# Patient Record
Sex: MALE | Race: WHITE | NOT HISPANIC OR LATINO | URBAN - METROPOLITAN AREA
[De-identification: names, ages, dates, MRNs, and addresses within clinical notes are randomized per-mention and may not be internally consistent; named-entity substitution may affect disease eponyms.]

---

## 2017-01-04 ENCOUNTER — ALLSCRIPTS OFFICE VISIT (OUTPATIENT)
Dept: OTHER | Facility: OTHER | Age: 42
End: 2017-01-04

## 2017-01-04 DIAGNOSIS — Z47.89 ENCOUNTER FOR OTHER ORTHOPEDIC AFTERCARE: ICD-10-CM

## 2017-01-18 ENCOUNTER — APPOINTMENT (OUTPATIENT)
Dept: OCCUPATIONAL THERAPY | Facility: CLINIC | Age: 42
End: 2017-01-18
Payer: COMMERCIAL

## 2017-01-18 DIAGNOSIS — Z47.89 ENCOUNTER FOR OTHER ORTHOPEDIC AFTERCARE: ICD-10-CM

## 2017-01-18 PROCEDURE — 97165 OT EVAL LOW COMPLEX 30 MIN: CPT

## 2017-01-18 PROCEDURE — 97110 THERAPEUTIC EXERCISES: CPT

## 2017-01-23 ENCOUNTER — APPOINTMENT (OUTPATIENT)
Dept: OCCUPATIONAL THERAPY | Facility: CLINIC | Age: 42
End: 2017-01-23
Payer: COMMERCIAL

## 2017-01-23 PROCEDURE — 97018 PARAFFIN BATH THERAPY: CPT

## 2017-01-23 PROCEDURE — 97110 THERAPEUTIC EXERCISES: CPT

## 2017-01-23 PROCEDURE — 97010 HOT OR COLD PACKS THERAPY: CPT

## 2017-01-26 ENCOUNTER — APPOINTMENT (OUTPATIENT)
Dept: OCCUPATIONAL THERAPY | Facility: CLINIC | Age: 42
End: 2017-01-26
Payer: COMMERCIAL

## 2017-01-26 PROCEDURE — 97140 MANUAL THERAPY 1/> REGIONS: CPT

## 2017-01-26 PROCEDURE — 97110 THERAPEUTIC EXERCISES: CPT

## 2017-01-26 PROCEDURE — 97018 PARAFFIN BATH THERAPY: CPT

## 2017-01-30 ENCOUNTER — APPOINTMENT (OUTPATIENT)
Dept: OCCUPATIONAL THERAPY | Facility: CLINIC | Age: 42
End: 2017-01-30
Payer: COMMERCIAL

## 2017-01-30 PROCEDURE — 97140 MANUAL THERAPY 1/> REGIONS: CPT

## 2017-01-30 PROCEDURE — 97018 PARAFFIN BATH THERAPY: CPT

## 2017-01-30 PROCEDURE — 97110 THERAPEUTIC EXERCISES: CPT

## 2017-02-01 ENCOUNTER — ALLSCRIPTS OFFICE VISIT (OUTPATIENT)
Dept: OTHER | Facility: OTHER | Age: 42
End: 2017-02-01

## 2017-02-02 ENCOUNTER — APPOINTMENT (OUTPATIENT)
Dept: OCCUPATIONAL THERAPY | Facility: CLINIC | Age: 42
End: 2017-02-02
Payer: COMMERCIAL

## 2017-02-06 ENCOUNTER — APPOINTMENT (OUTPATIENT)
Dept: OCCUPATIONAL THERAPY | Facility: CLINIC | Age: 42
End: 2017-02-06
Payer: COMMERCIAL

## 2017-02-06 PROCEDURE — 97140 MANUAL THERAPY 1/> REGIONS: CPT

## 2017-02-06 PROCEDURE — 97110 THERAPEUTIC EXERCISES: CPT

## 2017-02-06 PROCEDURE — 97018 PARAFFIN BATH THERAPY: CPT

## 2017-02-07 ENCOUNTER — APPOINTMENT (OUTPATIENT)
Dept: OCCUPATIONAL THERAPY | Facility: CLINIC | Age: 42
End: 2017-02-07
Payer: COMMERCIAL

## 2017-02-09 ENCOUNTER — APPOINTMENT (OUTPATIENT)
Dept: OCCUPATIONAL THERAPY | Facility: CLINIC | Age: 42
End: 2017-02-09
Payer: COMMERCIAL

## 2017-02-09 PROCEDURE — 97140 MANUAL THERAPY 1/> REGIONS: CPT

## 2017-02-09 PROCEDURE — 97110 THERAPEUTIC EXERCISES: CPT

## 2017-02-09 PROCEDURE — 97018 PARAFFIN BATH THERAPY: CPT

## 2017-02-13 ENCOUNTER — APPOINTMENT (OUTPATIENT)
Dept: OCCUPATIONAL THERAPY | Facility: CLINIC | Age: 42
End: 2017-02-13
Payer: COMMERCIAL

## 2017-02-16 ENCOUNTER — APPOINTMENT (OUTPATIENT)
Dept: OCCUPATIONAL THERAPY | Facility: CLINIC | Age: 42
End: 2017-02-16
Payer: COMMERCIAL

## 2017-02-20 ENCOUNTER — APPOINTMENT (OUTPATIENT)
Dept: OCCUPATIONAL THERAPY | Facility: CLINIC | Age: 42
End: 2017-02-20
Payer: COMMERCIAL

## 2017-02-20 PROCEDURE — 97140 MANUAL THERAPY 1/> REGIONS: CPT

## 2017-02-20 PROCEDURE — 97110 THERAPEUTIC EXERCISES: CPT

## 2017-02-20 PROCEDURE — 97018 PARAFFIN BATH THERAPY: CPT

## 2017-02-23 ENCOUNTER — APPOINTMENT (OUTPATIENT)
Dept: OCCUPATIONAL THERAPY | Facility: CLINIC | Age: 42
End: 2017-02-23
Payer: COMMERCIAL

## 2017-02-23 PROCEDURE — 97018 PARAFFIN BATH THERAPY: CPT

## 2017-02-23 PROCEDURE — 97140 MANUAL THERAPY 1/> REGIONS: CPT

## 2017-02-23 PROCEDURE — 97110 THERAPEUTIC EXERCISES: CPT

## 2017-02-27 ENCOUNTER — GENERIC CONVERSION - ENCOUNTER (OUTPATIENT)
Dept: OTHER | Facility: OTHER | Age: 42
End: 2017-02-27

## 2017-02-27 ENCOUNTER — APPOINTMENT (OUTPATIENT)
Dept: OCCUPATIONAL THERAPY | Facility: CLINIC | Age: 42
End: 2017-02-27
Payer: COMMERCIAL

## 2017-02-27 PROCEDURE — 97140 MANUAL THERAPY 1/> REGIONS: CPT

## 2017-02-27 PROCEDURE — 97110 THERAPEUTIC EXERCISES: CPT

## 2017-02-27 PROCEDURE — 97022 WHIRLPOOL THERAPY: CPT

## 2017-03-02 ENCOUNTER — APPOINTMENT (OUTPATIENT)
Dept: OCCUPATIONAL THERAPY | Facility: CLINIC | Age: 42
End: 2017-03-02
Payer: COMMERCIAL

## 2017-03-02 PROCEDURE — 97110 THERAPEUTIC EXERCISES: CPT

## 2017-03-02 PROCEDURE — 97140 MANUAL THERAPY 1/> REGIONS: CPT

## 2017-03-02 PROCEDURE — 97022 WHIRLPOOL THERAPY: CPT

## 2017-03-03 ENCOUNTER — GENERIC CONVERSION - ENCOUNTER (OUTPATIENT)
Dept: OTHER | Facility: OTHER | Age: 42
End: 2017-03-03

## 2017-03-06 ENCOUNTER — APPOINTMENT (OUTPATIENT)
Dept: OCCUPATIONAL THERAPY | Facility: CLINIC | Age: 42
End: 2017-03-06
Payer: COMMERCIAL

## 2017-03-06 PROCEDURE — 97022 WHIRLPOOL THERAPY: CPT

## 2017-03-06 PROCEDURE — 97140 MANUAL THERAPY 1/> REGIONS: CPT

## 2017-03-06 PROCEDURE — 97110 THERAPEUTIC EXERCISES: CPT

## 2017-03-09 ENCOUNTER — APPOINTMENT (OUTPATIENT)
Dept: OCCUPATIONAL THERAPY | Facility: CLINIC | Age: 42
End: 2017-03-09
Payer: COMMERCIAL

## 2017-03-09 PROCEDURE — 97022 WHIRLPOOL THERAPY: CPT

## 2017-03-09 PROCEDURE — 97110 THERAPEUTIC EXERCISES: CPT

## 2017-03-09 PROCEDURE — 97140 MANUAL THERAPY 1/> REGIONS: CPT

## 2017-03-13 ENCOUNTER — APPOINTMENT (OUTPATIENT)
Dept: OCCUPATIONAL THERAPY | Facility: CLINIC | Age: 42
End: 2017-03-13
Payer: COMMERCIAL

## 2017-03-16 ENCOUNTER — APPOINTMENT (OUTPATIENT)
Dept: OCCUPATIONAL THERAPY | Facility: CLINIC | Age: 42
End: 2017-03-16
Payer: COMMERCIAL

## 2017-03-16 PROCEDURE — 97110 THERAPEUTIC EXERCISES: CPT

## 2017-03-16 PROCEDURE — 97022 WHIRLPOOL THERAPY: CPT

## 2017-03-16 PROCEDURE — 97140 MANUAL THERAPY 1/> REGIONS: CPT

## 2017-03-20 ENCOUNTER — APPOINTMENT (OUTPATIENT)
Dept: OCCUPATIONAL THERAPY | Facility: CLINIC | Age: 42
End: 2017-03-20
Payer: COMMERCIAL

## 2017-03-23 ENCOUNTER — APPOINTMENT (OUTPATIENT)
Dept: OCCUPATIONAL THERAPY | Facility: CLINIC | Age: 42
End: 2017-03-23
Payer: COMMERCIAL

## 2017-03-23 PROCEDURE — 97140 MANUAL THERAPY 1/> REGIONS: CPT

## 2017-03-23 PROCEDURE — 97110 THERAPEUTIC EXERCISES: CPT

## 2017-03-23 PROCEDURE — 97010 HOT OR COLD PACKS THERAPY: CPT

## 2017-03-27 ENCOUNTER — APPOINTMENT (OUTPATIENT)
Dept: OCCUPATIONAL THERAPY | Facility: CLINIC | Age: 42
End: 2017-03-27
Payer: COMMERCIAL

## 2017-03-30 ENCOUNTER — APPOINTMENT (OUTPATIENT)
Dept: OCCUPATIONAL THERAPY | Facility: CLINIC | Age: 42
End: 2017-03-30
Payer: COMMERCIAL

## 2017-08-02 ENCOUNTER — TRANSCRIBE ORDERS (OUTPATIENT)
Dept: ADMINISTRATIVE | Facility: HOSPITAL | Age: 42
End: 2017-08-02

## 2017-08-02 DIAGNOSIS — N63.0 LUMP OR MASS IN BREAST: Primary | ICD-10-CM

## 2017-08-04 ENCOUNTER — HOSPITAL ENCOUNTER (OUTPATIENT)
Dept: MAMMOGRAPHY | Facility: CLINIC | Age: 42
Discharge: HOME | End: 2017-08-04
Payer: COMMERCIAL

## 2017-08-04 ENCOUNTER — HOSPITAL ENCOUNTER (OUTPATIENT)
Dept: ULTRASOUND IMAGING | Facility: CLINIC | Age: 42
Discharge: HOME | End: 2017-08-04
Payer: COMMERCIAL

## 2017-08-04 DIAGNOSIS — N63.0 LUMP OR MASS IN BREAST: ICD-10-CM

## 2017-08-04 PROCEDURE — 76642 ULTRASOUND BREAST LIMITED: CPT

## 2017-08-04 PROCEDURE — G0204 DX MAMMO INCL CAD BI: HCPCS

## 2018-01-12 VITALS
DIASTOLIC BLOOD PRESSURE: 78 MMHG | HEART RATE: 72 BPM | HEIGHT: 70 IN | WEIGHT: 164 LBS | BODY MASS INDEX: 23.48 KG/M2 | SYSTOLIC BLOOD PRESSURE: 122 MMHG

## 2018-01-13 VITALS
HEART RATE: 74 BPM | BODY MASS INDEX: 25.34 KG/M2 | SYSTOLIC BLOOD PRESSURE: 116 MMHG | HEIGHT: 70 IN | DIASTOLIC BLOOD PRESSURE: 80 MMHG | WEIGHT: 177 LBS

## 2018-01-13 NOTE — MISCELLANEOUS
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

## 2018-01-13 NOTE — MISCELLANEOUS
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

## 2018-08-10 ENCOUNTER — APPOINTMENT (OUTPATIENT)
Dept: RADIOLOGY | Facility: CLINIC | Age: 43
End: 2018-08-10
Payer: COMMERCIAL

## 2018-08-10 ENCOUNTER — OFFICE VISIT (OUTPATIENT)
Dept: URGENT CARE | Facility: CLINIC | Age: 43
End: 2018-08-10
Payer: COMMERCIAL

## 2018-08-10 VITALS
BODY MASS INDEX: 20.3 KG/M2 | TEMPERATURE: 98.1 F | RESPIRATION RATE: 16 BRPM | WEIGHT: 141.8 LBS | HEART RATE: 68 BPM | HEIGHT: 70 IN | DIASTOLIC BLOOD PRESSURE: 78 MMHG | SYSTOLIC BLOOD PRESSURE: 136 MMHG | OXYGEN SATURATION: 96 %

## 2018-08-10 DIAGNOSIS — S89.91XA INJURY OF RIGHT SHIN, INITIAL ENCOUNTER: ICD-10-CM

## 2018-08-10 DIAGNOSIS — S89.91XA INJURY OF RIGHT SHIN, INITIAL ENCOUNTER: Primary | ICD-10-CM

## 2018-08-10 PROCEDURE — 73590 X-RAY EXAM OF LOWER LEG: CPT

## 2018-08-10 PROCEDURE — 99213 OFFICE O/P EST LOW 20 MIN: CPT | Performed by: PHYSICIAN ASSISTANT

## 2018-08-10 RX ORDER — METHYLPHENIDATE HYDROCHLORIDE 27 MG/1
27 TABLET ORAL DAILY
COMMUNITY

## 2018-08-10 RX ORDER — ARIPIPRAZOLE 5 MG/1
7 TABLET ORAL DAILY
COMMUNITY

## 2018-08-10 RX ORDER — GUANFACINE 2 MG/1
3 TABLET ORAL
COMMUNITY

## 2018-08-10 NOTE — PROGRESS NOTES
NAME: Hector Cervantes is a 38 y o  male  : 1975    MRN: [id number]      Assessment and Plan   Injury of right shin, initial encounter [S89 [id number]]  1  Injury of right shin, initial encounter  XR tibia fibula 2 vw right     x-ray right LE: without acute fracture      Patient Instructions   Patient Instructions   Take ibuprofen for pain  Apply ice to the area  Apply antibiotic ointment daily  If develops fever, chills, spreading redness, discharge from the wound follow up either here with PCP  Call tomorrow for official radiology read    Proceed to ER if symptoms worsen  History of Present Illness     Patient presents complaining of right lower leg pain  He reports a week ago he was dragged by his dogs across wet grass and hit his right anterior shin on a deck post   He reports that the next day or so he started to feel little better but since then he has not improved at all  He reports pain with dorsiflexion  Denies any numbness or tingling to the foot or any weakness  Reports that he recently had a tetanus within the last year to after being bit by a dog  Denies any fever, chills, spreading redness, warmth, discharge  Review of Systems   Review of Systems   Constitutional: Negative for chills and fever  Skin: Positive for wound           Current Medications       Current Outpatient Prescriptions:     ARIPiprazole (ABILIFY) 5 mg tablet, Take 7 mg by mouth daily, Disp: , Rfl:     guanFACINE (TENEX) 2 MG tablet, Take 3 mg by mouth daily at bedtime, Disp: , Rfl:     methylphenidate (CONCERTA) 27 MG ER tablet, Take 27 mg by mouth daily, Disp: , Rfl:     Current Allergies     Allergies as of 08/10/2018 - Reviewed 08/10/2018   Allergen Reaction Noted    Penicillins  2016              Past Medical History:   Diagnosis Date    ADHD (attention deficit hyperactivity disorder)     Asperger's syndrome     Myoclonic jerking        Past Surgical History:   Procedure Laterality Date    BREAST LUMPECTOMY Left     HAND TENDON SURGERY Right 12/14/2016    Procedure: REPAIR EXTENSOR TENDON RIGHT THUMB ;  Surgeon: Amber Acuna MD;  Location: [facility] OR;  Service:        No family history on file  Medications have been verified  The following portions of the patient's history were reviewed and updated as appropriate: allergies, current medications, past family history, past medical history, past social history, past surgical history and problem list     Objective   /78   Pulse 68   Temp 98 1 °F (36 7 °C) (Tympanic)   Resp 16   Ht 5' 10" (1 778 m)   Wt 64 3 kg (141 lb 12 8 oz)   SpO2 96%   BMI 20 35 kg/m²      Physical Exam     Physical Exam   Constitutional: He appears well-developed and well-nourished  No distress  Skin:   7 cm by 2 cm longitudinal linear wound completely scabbed over with mild surrounding erythema and immediate borders  Without further erythema, warmth, edema, ecchymosis or discharge from the wound  Tender to palpation over the tibia adjacent to the wound  Full range of motion of knee and ankle with some mild pain on dorsiflexion   NSI

## 2018-08-10 NOTE — PATIENT INSTRUCTIONS
Take ibuprofen for pain  Apply ice to the area  Apply antibiotic ointment daily  If develops fever, chills, spreading redness, discharge from the wound follow up either here with PCP  Call tomorrow for official radiology read

## 2018-10-24 ENCOUNTER — TRANSCRIBE ORDERS (OUTPATIENT)
Dept: ADMINISTRATIVE | Facility: HOSPITAL | Age: 43
End: 2018-10-24

## 2018-10-24 ENCOUNTER — HOSPITAL ENCOUNTER (OUTPATIENT)
Dept: RADIOLOGY | Facility: HOSPITAL | Age: 43
Discharge: HOME | End: 2018-10-24
Attending: FAMILY MEDICINE
Payer: COMMERCIAL

## 2018-10-24 DIAGNOSIS — M25.512 PAIN IN JOINT OF LEFT SHOULDER: ICD-10-CM

## 2018-10-24 DIAGNOSIS — M25.512 PAIN IN JOINT OF LEFT SHOULDER: Primary | ICD-10-CM

## 2018-10-24 PROCEDURE — 73030 X-RAY EXAM OF SHOULDER: CPT

## 2018-11-30 ENCOUNTER — OFFICE VISIT (OUTPATIENT)
Dept: OBGYN CLINIC | Facility: CLINIC | Age: 43
End: 2018-11-30
Payer: COMMERCIAL

## 2018-11-30 VITALS
SYSTOLIC BLOOD PRESSURE: 114 MMHG | HEIGHT: 70 IN | WEIGHT: 156 LBS | BODY MASS INDEX: 22.33 KG/M2 | DIASTOLIC BLOOD PRESSURE: 76 MMHG | HEART RATE: 69 BPM

## 2018-11-30 DIAGNOSIS — M75.02 ADHESIVE CAPSULITIS OF LEFT SHOULDER: Primary | ICD-10-CM

## 2018-11-30 PROCEDURE — 99243 OFF/OP CNSLTJ NEW/EST LOW 30: CPT | Performed by: ORTHOPAEDIC SURGERY

## 2018-11-30 NOTE — PROGRESS NOTES
Ortho Sports Medicine Shoulder Visit     Assesment:     left shoulder adhesive capsulitis    Plan:    Conservative treatment:    Ice to shoulder 1-2 times daily, for 20 minutes at a time  PT for ROM and strengthening to shoulder, rotator cuff, scapular stabilizers  Imaging: All imaging from today was reviewed by myself and explained to the patient  Injection:    No Injection planned at this time  Will consider an ultrasound guided glenohumeral joint corticosteroid injection at our next visit if his symptoms worsen or fail to improve  Surgery:     No surgery is recommended at this point, continue with conservative treatment plan as noted  Follow up:    Return in about 8 weeks (around 1/25/2019) for Recheck  Chief Complaint   Patient presents with    Left Shoulder - Pain     History of Present Illness: The patient is a 37 y o , right hand dominant male whose occupation is , referred to me by their primary care physician, seen in clinic for consultation of left shoulder pain  The patient denies a history of diabetes  The patient denies a history of thyroid disorder  Pain is located anterior, lateral, deep  The patient rates the pain as a 6/10  The pain has been present for 4 months  The patient denies an injury  The pain was insidious in nature  The pain is characterized as dull, achy  The pain is present daily  Pain is improved by rest and ice  Pain is aggravated by overhead activity, reaching back, rotation, lifting  and exercising  Symptoms include clicking, catching and popping  The patient denies weakness  The patient denies numbness and tingling  The patient has tried rest, ice and NSAIDS            Shoulder Surgical History:  None    Past Medical, Social and Family History:  Past Medical History:   Diagnosis Date    ADHD (attention deficit hyperactivity disorder)     Asperger's syndrome     Myoclonic jerking      Past Surgical History:   Procedure Laterality Date    BREAST LUMPECTOMY Left     HAND TENDON SURGERY Right 12/14/2016    Procedure: REPAIR EXTENSOR TENDON RIGHT THUMB ;  Surgeon: Ruth Holt MD;  [facility] OR;  Service:      Allergies   Allergen Reactions    Penicillins      Current Outpatient Prescriptions on File Prior to Visit   Medication Sig Dispense Refill    ARIPiprazole (ABILIFY) 5 mg tablet Take 7 mg by mouth daily      guanFACINE (TENEX) 2 MG tablet Take 3 mg by mouth daily at bedtime      methylphenidate (CONCERTA) 27 MG ER tablet Take 27 mg by mouth daily       No current facility-administered medications on file prior to visit  Social History     Social History    Marital status: Single     Spouse name: N/A    Number of children: N/A    Years of education: N/A     Occupational History    Not on file  Social History Main Topics    Smoking status: Never Smoker    Smokeless tobacco: Never Used    Alcohol use Yes    Drug use: No    Sexual activity: Not on file     Other Topics Concern    Not on file     Social History Narrative    No narrative on file         I have reviewed the past medical, surgical, social and family history, medications and allergies as documented in the EMR  Review of systems: ROS is negative other than that noted in the HPI  Constitutional: Negative for fatigue and fever  HENT: Negative for sore throat  Respiratory: Negative for shortness of breath  Cardiovascular: Negative for chest pain  Gastrointestinal: Negative for abdominal pain  Endocrine: Negative for cold intolerance and heat intolerance  Genitourinary: Negative for flank pain  Musculoskeletal: Negative for back pain  Skin: Negative for rash  Allergic/Immunologic: Negative for immunocompromised state  Neurological: Negative for dizziness  Psychiatric/Behavioral: Negative for agitation        Physical Exam:    Blood pressure 114/76, pulse 69, height 5' 10" (1 778 m), weight 70 8 kg (156 lb)  General/Constitutional: NAD, well developed, well nourished  HENT: Normocephalic, atraumatic  CV: Intact distal pulses, regular rate  Resp: No respiratory distress or labored breathing  Lymphatic: No lymphadenopathy palpated  Neuro: Alert and Oriented x 3, no focal deficits  Psych: Normal mood, normal affect, normal judgement, normal behavior  Skin: Warm, dry, no rashes, no erythema    Shoulder Exam (focused): Shoulder focused exam:       RIGHT LEFT    Scapula Atrophy Negative Negative     Winging Negative Negative     Protraction Negative Negative    Rotator cuff SS 5/5 5/5     IS 5/5 5/5     SubS 5/5 5/5    AROM  150     ER0 60 30     ER90 90 60     IR90 40 20     IRb T6 L4    TTP: AC Negative Negative     Biceps Negative Negative     Coracoid Negative Negative    Special Tests: O'Briens Negative Negative     Nguyen-shear Negative Negative     Cross body Adduction Negative Negative     Speeds  Negative Negative     Zaida's Negative Negative     Whipple Negative Negative       Neer Negative Negative     Martines Negative Negative    Instability: Apprehension & relocation not tested not tested     Load & shift not tested not tested    Other: Crank Negative Negative               PROM: Left     ER at 0: 30 degree     ER at 90: 30 degrees     IR at 90: 0 degrees    UE NV Exam: +2 Radial pulses bilaterally  Sensation intact to light touch C5-T1 bilaterally, Radial/median/ulnar nerve motor intact      Bilateral elbow, wrist, and and forearm ROM full, painless with passive ROM, no ttp or crepitance throughout extremities below shoulder joint    Cervical ROM is full without pain, numbness or tingling      Shoulder Imaging    X-rays of the left shoulder were reviewed, which demonstrates mild AC joint arthritis  Otherwise normal examination without fracture, dislocation, or osseous abnormality  I have reviewed the radiology report and agree with their impression        Scribe Attestation I,:   Cheko Medina am acting as a scribe while in the presence of the attending physician :        I,:   Caroline Blum, DO personally performed the services described in this documentation    as scribed in my presence :

## 2018-11-30 NOTE — LETTER
November 30, 2018     Patient: Ladonna Nino   YOB: 1975   Date of Visit: 11/30/2018       To Whom it May Concern:    Orlin Ca is under my professional care  He was seen in my office on 11/30/2018 for left shoulder adhesive capsulitis  He should be excused from all work activities that involve use of his left upper extremity until further notice  If you have any questions or concerns, please don't hesitate to call           Sincerely,          Doc Pollack DO        CC: No Recipients

## 2018-11-30 NOTE — LETTER
November 30, 2018     Tobi Haynes, [address] [address]    Patient: Topher Rausch   YOB: 1975   Date of Visit: 11/30/2018       Dear Dr River Ramírez: Thank you for referring Inocente Nix to me for evaluation  Below are my notes for this consultation  If you have questions, please do not hesitate to call me  I look forward to following your patient along with you  Sincerely,        Gabrielle Mcleod DO        CC: No Recipients  Gabrielle Mcleod DO  11/30/2018  1:05 PM  Sign at close encounter  Ortho Sports Medicine Shoulder Visit     Assesment:     left shoulder adhesive capsulitis    Plan:    Conservative treatment:    Ice to shoulder 1-2 times daily, for 20 minutes at a time  PT for ROM and strengthening to shoulder, rotator cuff, scapular stabilizers  Imaging: All imaging from today was reviewed by myself and explained to the patient  Injection:    No Injection planned at this time  Will consider an ultrasound guided glenohumeral joint corticosteroid injection at our next visit if his symptoms worsen or fail to improve  Surgery:     No surgery is recommended at this point, continue with conservative treatment plan as noted  Follow up:    Return in about 8 weeks (around 1/25/2019) for Recheck  Chief Complaint   Patient presents with    Left Shoulder - Pain     History of Present Illness: The patient is a 37 y o , right hand dominant male whose occupation is , referred to me by their primary care physician, seen in clinic for consultation of left shoulder pain  The patient denies a history of diabetes  The patient denies a history of thyroid disorder  Pain is located anterior, lateral, deep  The patient rates the pain as a 6/10  The pain has been present for 4 months  The patient denies an injury  The pain was insidious in nature  The pain is characterized as dull, achy    The pain is present daily  Pain is improved by rest and ice  Pain is aggravated by overhead activity, reaching back, rotation, lifting  and exercising  Symptoms include clicking, catching and popping  The patient denies weakness  The patient denies numbness and tingling  The patient has tried rest, ice and NSAIDS  Shoulder Surgical History:  None    Past Medical, Social and Family History:  Past Medical History:   Diagnosis Date    ADHD (attention deficit hyperactivity disorder)     Asperger's syndrome     Myoclonic jerking      Past Surgical History:   Procedure Laterality Date    BREAST LUMPECTOMY Left     HAND TENDON SURGERY Right 12/14/2016    Procedure: REPAIR EXTENSOR TENDON RIGHT THUMB ;  Surgeon: Roger Eldridge MD;  Location: [facility] OR;  Service:      Allergies   Allergen Reactions    Penicillins      Current Outpatient Prescriptions on File Prior to Visit   Medication Sig Dispense Refill    ARIPiprazole (ABILIFY) 5 mg tablet Take 7 mg by mouth daily      guanFACINE (TENEX) 2 MG tablet Take 3 mg by mouth daily at bedtime      methylphenidate (CONCERTA) 27 MG ER tablet Take 27 mg by mouth daily       No current facility-administered medications on file prior to visit  Social History     Social History    Marital status: Single     Spouse name: N/A    Number of children: N/A    Years of education: N/A     Occupational History    Not on file  Social History Main Topics    Smoking status: Never Smoker    Smokeless tobacco: Never Used    Alcohol use Yes    Drug use: No    Sexual activity: Not on file     Other Topics Concern    Not on file     Social History Narrative    No narrative on file         I have reviewed the past medical, surgical, social and family history, medications and allergies as documented in the EMR  Review of systems: ROS is negative other than that noted in the HPI  Constitutional: Negative for fatigue and fever     HENT: Negative for sore throat  Respiratory: Negative for shortness of breath  Cardiovascular: Negative for chest pain  Gastrointestinal: Negative for abdominal pain  Endocrine: Negative for cold intolerance and heat intolerance  Genitourinary: Negative for flank pain  Musculoskeletal: Negative for back pain  Skin: Negative for rash  Allergic/Immunologic: Negative for immunocompromised state  Neurological: Negative for dizziness  Psychiatric/Behavioral: Negative for agitation  Physical Exam:    Blood pressure 114/76, pulse 69, height 5' 10" (1 778 m), weight 70 8 kg (156 lb)  General/Constitutional: NAD, well developed, well nourished  HENT: Normocephalic, atraumatic  CV: Intact distal pulses, regular rate  Resp: No respiratory distress or labored breathing  Lymphatic: No lymphadenopathy palpated  Neuro: Alert and Oriented x 3, no focal deficits  Psych: Normal mood, normal affect, normal judgement, normal behavior  Skin: Warm, dry, no rashes, no erythema    Shoulder Exam (focused):     Shoulder focused exam:       RIGHT LEFT    Scapula Atrophy Negative Negative     Winging Negative Negative     Protraction Negative Negative    Rotator cuff SS 5/5 5/5     IS 5/5 5/5     SubS 5/5 5/5    AROM  150     ER0 60 30     ER90 90 60     IR90 40 20     IRb T6 L4    TTP: AC Negative Negative     Biceps Negative Negative     Coracoid Negative Negative    Special Tests: O'Briens Negative Negative     Nguyen-shear Negative Negative     Cross body Adduction Negative Negative     Speeds  Negative Negative     Zaida's Negative Negative     Whipple Negative Negative       Neer Negative Negative     Martines Negative Negative    Instability: Apprehension & relocation not tested not tested     Load & shift not tested not tested    Other: Crank Negative Negative               PROM: Left     ER at 0: 30 degree     ER at 90: 30 degrees     IR at 90: 0 degrees    UE NV Exam: +2 Radial pulses bilaterally  Sensation intact to light touch C5-T1 bilaterally, Radial/median/ulnar nerve motor intact      Bilateral elbow, wrist, and and forearm ROM full, painless with passive ROM, no ttp or crepitance throughout extremities below shoulder joint    Cervical ROM is full without pain, numbness or tingling      Shoulder Imaging    X-rays of the left shoulder were reviewed, which demonstrates mild AC joint arthritis  Otherwise normal examination without fracture, dislocation, or osseous abnormality  I have reviewed the radiology report and agree with their impression        Scribe Attestation    I,:   Niki Anthony am acting as a scribe while in the presence of the attending physician :        I,:   Dimitrios Robles, DO personally performed the services described in this documentation    as scribed in my presence :

## 2019-01-02 ENCOUNTER — EVALUATION (OUTPATIENT)
Dept: PHYSICAL THERAPY | Facility: CLINIC | Age: 44
End: 2019-01-02
Payer: COMMERCIAL

## 2019-01-02 DIAGNOSIS — M75.02 ADHESIVE CAPSULITIS OF LEFT SHOULDER: ICD-10-CM

## 2019-01-02 PROCEDURE — 97112 NEUROMUSCULAR REEDUCATION: CPT | Performed by: PHYSICAL THERAPIST

## 2019-01-02 PROCEDURE — G8991 OTHER PT/OT GOAL STATUS: HCPCS | Performed by: PHYSICAL THERAPIST

## 2019-01-02 PROCEDURE — G8990 OTHER PT/OT CURRENT STATUS: HCPCS | Performed by: PHYSICAL THERAPIST

## 2019-01-02 PROCEDURE — 97161 PT EVAL LOW COMPLEX 20 MIN: CPT | Performed by: PHYSICAL THERAPIST

## 2019-01-02 NOTE — PROGRESS NOTES
PT Evaluation     Today's date: 2019  Patient name: Justice Mohan  : 1975  MRN: [id number]  Referring provider: Severo Robertson DO  Dx:   Encounter Diagnosis     ICD-10-CM    1  Adhesive capsulitis of left shoulder M75 02 Ambulatory referral to Physical Therapy       Start Time:   Stop Time: 1523  Total time in clinic (min): 38 minutes    Assessment/Plan    This patient presents w/ L shoulder dysfunction of about 6 months duration  Problems include decreased ROM, decreased strength, impaired function and pain  Clinical findings are consistent with adhesive capsulitis  Personal factors : high co-pay will limit number of therapy sessions patient is able to atend  This patient is a good candidate for skilled physical therapy to reduce pain and improve function  Interventions to include manual therapy, ROM, stretching, strengthening, therapeutic activities, neuromuscular re-ed and instruction in HEP  Frequency and duration: 1-2 x/week for 6  weeks    STGs: 2-4 weeks  1  Increase ROM L shoulder by 10-20 degrees  2  Decrease pain 2 levels  3  LTGs: 6-8 weeks  1  Independent HEP  2  Increase strength LUE to 5/5 throughout  3  Increase FOTO score to predicted level: 76  4  Return to previous activities w/out pain >2/10  5  Increase AROM L shoulder flexion and abd to 160 degrees  6           Increase ROM L shoulder ER to at least 50 degrees            Subjective This is a 37 y o  male who reports onset of symptoms about 6 months ago; was dx with frozen shoulder  Current complaint: pain lateral and anterior L shoulder, limited ROM, very painful at end range; states that it is beginning to improve; deines sleep disturbance as long as he does nottry to sleep on the L side  Aggravating factors: trying to move arm too far, sudden movements, lying on the involved side  Relieving factors: rest  Previous treatment: none  Dx tests: x-rays  Occupation: [employer]   Leisure: volunteer fire dept; ride horses    Patients goal: to restore full ROM, return to normal activities, including riding    Objective  Pain scale: 0-5/10  Cervical screen: WNL  Palpation: unremarkable      left  Shoulder  AROM  PROM  Strength  flexion 140 140 5/5   extension 33 35 5/5   Abduction 105 110 4+/5   Horiz Add   5/5   Horiz Abd      ER 20 20 5/5   IR 30 30 5/5   Pain end range L shoulder ROM all planes    L elbow / wrist ROM/strength WNL    right extremity ROM / strength WFL      Flowsheet Rows      Most Recent Value   PT/OT G-Codes   Current Score  62   FOTO information reviewed  Yes   Assessment Type  Evaluation   G code set  Other PT/OT Primary   Other PT Primary Current Status ()  CJ   Other PT Primary Goal Status ()  CJ                Precautions: NONE      Daily Treatment Diary     Manual  1/2/19            PROM/mobs nv                                                                    Exercise Diary  1/2/19            Pendulums 3# 2'            Cane :horiz add 10x            Scap retraction             Cane: sh flex 10x            Cane: sh ER 90/90 supine 10x            Cane: sh scaption 10x            Cane: ext 10x            Scap punches             Towel stretch             Cane: ER/IR @ side             Wt shift on table             Standing scap/ flexion                                                                                                                         Modalities              CP prn

## 2019-01-11 ENCOUNTER — OFFICE VISIT (OUTPATIENT)
Dept: PHYSICAL THERAPY | Facility: CLINIC | Age: 44
End: 2019-01-11
Payer: COMMERCIAL

## 2019-01-11 DIAGNOSIS — M75.02 ADHESIVE CAPSULITIS OF LEFT SHOULDER: Primary | ICD-10-CM

## 2019-01-11 PROCEDURE — 97112 NEUROMUSCULAR REEDUCATION: CPT | Performed by: PHYSICAL THERAPIST

## 2019-01-11 PROCEDURE — 97140 MANUAL THERAPY 1/> REGIONS: CPT | Performed by: PHYSICAL THERAPIST

## 2019-01-11 NOTE — PROGRESS NOTES
Daily Note     Today's date: 2019  Patient name: Yamila Lim  : 1975  MRN: [id number]  Referring provider: Lashae Burrell DO  Dx:   Encounter Diagnosis     ICD-10-CM    1  Adhesive capsulitis of left shoulder M75 02        Start Time: 0846          Subjective: was sore for several days p IE; overall pain continues to derease; still not able to lie on L side for long      Objective: See treatment diary below      Assessment: Progressed exercises and updated written hep  Pain end range but good tolerance for stretching  Tolerated treatment well  Patient would benefit from continued PT      Plan: Continue per plan of care                 Precautions: NONE      Daily Treatment Diary     Manual  19           PROM/mobs L sh nv JR                                                                   Exercise Diary  19           Pendulums 3# 2' 3# 2'           Cane :horiz add 10x 15x           Scap retraction depression  10x10"           Cane: sh flex 10x 15x           Cane: sh ER 90/90 supine 10x hep           Cane: sh scaption 10x 15x           Cane: ext 10x            Scap punches  15x           Towel stretch             Cane: ER/IR @ side  15x           Wt shift on table             Standing scap/ flexion             Overhead pulley  3'           butterflies  10x           T-spine rot w/ reach in SL  10x                                                                                Modalities              CP prn

## 2019-01-18 ENCOUNTER — OFFICE VISIT (OUTPATIENT)
Dept: PHYSICAL THERAPY | Facility: CLINIC | Age: 44
End: 2019-01-18
Payer: COMMERCIAL

## 2019-01-18 DIAGNOSIS — M75.02 ADHESIVE CAPSULITIS OF LEFT SHOULDER: Primary | ICD-10-CM

## 2019-01-18 PROCEDURE — 97112 NEUROMUSCULAR REEDUCATION: CPT | Performed by: PHYSICAL THERAPIST

## 2019-01-18 PROCEDURE — 97140 MANUAL THERAPY 1/> REGIONS: CPT | Performed by: PHYSICAL THERAPIST

## 2019-01-25 ENCOUNTER — OFFICE VISIT (OUTPATIENT)
Dept: PHYSICAL THERAPY | Facility: CLINIC | Age: 44
End: 2019-01-25
Payer: COMMERCIAL

## 2019-01-25 DIAGNOSIS — M75.02 ADHESIVE CAPSULITIS OF LEFT SHOULDER: Primary | ICD-10-CM

## 2019-01-25 PROCEDURE — 97140 MANUAL THERAPY 1/> REGIONS: CPT | Performed by: PHYSICAL THERAPIST

## 2019-01-25 PROCEDURE — 97112 NEUROMUSCULAR REEDUCATION: CPT | Performed by: PHYSICAL THERAPIST

## 2019-01-25 NOTE — PROGRESS NOTES
Daily Note     Today's date: 2019  Patient name: Татьяна Greer  : 1975  MRN: [id number]  Referring provider: Mendel Garrison, DO  Dx:   Encounter Diagnosis     ICD-10-CM    1  Adhesive capsulitis of left shoulder M75 02        Start Time: 830  Stop Time: 919  Total time in clinic (min): 55 minutes    Subjective:  States he feels like his other shoulder is stiffening up      Objective: See treatment diary below      Assessment: ROM continues to improve steadily  Progressed exercises as noted below  Tolerated treatment well  Patient would benefit from continued PT      Plan: Continue per plan of care                 Precautions: NONE      Daily Treatment Diary     Manual  19         PROM/mobs L sh nv JR JR JR         AAROM prone IR w/ ext   JR                                                     Exercise Diary  19         Pendulums 3# 2' 3# 2' hep -- -- -- -- -- -- --   Cane :horiz add 10x 15x hep -- -- -- -- -- -- -   Scap retraction depression  10x10" hep -- -- -- -- -- -- --   Cane: sh flex 10x 15x hep -- -- -- -- -- -- --   Cane: sh ER 90/90 supine 10x hep hep -- -- -- -- -- -- --   Cane: sh scaption 10x 15x hep -- -- -- -- -- -- --   Cane: ext 10x  hep -- -- -- -- -- -- --   Scap punches  15x hep -- -- -- -- -- -- --   Towel stretch             Cane: ER/IR @ side  15x hep -- -- -- -- -- -- --   T-spine rot w/ IR    10xea         T-spine rot w/ reach leantable   10xea 10xea         Standing scap/ flexion   nv 10xea         Overhead pulley  3' 3' 3'         butterflies  10x hep -- -- -- -- -- -- --   T-spine rot w/ reach in SL  10x 10x Stand 10x         Prone ext/IR hand on back   10x 10x         B sh flex TB assist   10x 10 GTB         TB abd assist    10xG         TB row    10x2G         UBE   3'/3' 3'/3'             Modalities              CP prn

## 2019-02-01 ENCOUNTER — OFFICE VISIT (OUTPATIENT)
Dept: PHYSICAL THERAPY | Facility: CLINIC | Age: 44
End: 2019-02-01
Payer: COMMERCIAL

## 2019-02-01 DIAGNOSIS — M75.02 ADHESIVE CAPSULITIS OF LEFT SHOULDER: Primary | ICD-10-CM

## 2019-02-01 PROCEDURE — 97140 MANUAL THERAPY 1/> REGIONS: CPT | Performed by: PHYSICAL THERAPIST

## 2019-02-01 PROCEDURE — 97112 NEUROMUSCULAR REEDUCATION: CPT | Performed by: PHYSICAL THERAPIST

## 2019-02-01 NOTE — PROGRESS NOTES
Daily Note     Today's date: 2019  Patient name: Eugenie Lowery  : 1975  MRN: [id number]  Referring provider: Latisha Morales DO  Dx:   Encounter Diagnosis     ICD-10-CM    1  Adhesive capsulitis of left shoulder M75 02        Start Time:   Stop Time:   Total time in clinic (min): 48 minutes    Subjective:  Shoulder continues to improve      Objective: See treatment diary below      Assessment: pain end range shoulder motions but ROM continues to steadily improve  Limited in B thoracic rotation (L>R)  Tolerated treatment well  Patient would benefit from continued PT      Plan: Continue per plan of care                 Precautions: NONE      Daily Treatment Diary     Manual  19        PROM/mobs L sh nv JR JR JR JR        AAROM prone IR w/ ext   JR                                                     Exercise Diary  19        Pendulums 3# 2' 3# 2' hep -- -- -- -- -- -- --   Cane :horiz add 10x 15x hep -- -- -- -- -- -- -   Scap retraction depression  10x10" hep -- -- -- -- -- -- --   Cane: sh flex 10x 15x hep -- -- -- -- -- -- --   Cane: sh ER 90/90 supine 10x hep hep -- -- -- -- -- -- --   Cane: sh scaption 10x 15x hep -- -- -- -- -- -- --   Cane: ext 10x  hep -- -- -- -- -- -- --   Scap punches  15x hep -- -- -- -- -- -- --   Towel stretch             Cane: ER/IR @ side  15x hep -- -- -- -- -- -- --   T-spine rot w/ IR    10xea 10xea        T-spine rot w/ reach leantable   10xea 10xea 10x        Standing scap/ flexion   nv 10xea         Overhead pulley  3' 3' 3'         butterflies  10x hep -- -- -- -- -- -- --   T-spine rot w/ reach in SL  10x 10x Stand 10x __ -- -- -- -- --   Prone ext/IR hand on back   10x 10x         B sh flex TB assist   10x 10 GTB 10x        TB abd assist    10xG 10x        TB row    10x2G 10x        UBE   3'/3' 3'/3' 3'/3'            Modalities              CP prn

## 2019-02-08 ENCOUNTER — OFFICE VISIT (OUTPATIENT)
Dept: PHYSICAL THERAPY | Facility: CLINIC | Age: 44
End: 2019-02-08
Payer: COMMERCIAL

## 2019-02-08 DIAGNOSIS — M75.02 ADHESIVE CAPSULITIS OF LEFT SHOULDER: Primary | ICD-10-CM

## 2019-02-08 PROCEDURE — 97112 NEUROMUSCULAR REEDUCATION: CPT | Performed by: PHYSICAL THERAPIST

## 2019-02-08 PROCEDURE — 97140 MANUAL THERAPY 1/> REGIONS: CPT | Performed by: PHYSICAL THERAPIST

## 2019-02-08 NOTE — PROGRESS NOTES
Daily Note     Today's date: 2019  Patient name: Taiwo Alegria  : 1975  MRN: [id number]  Referring provider: Marilyn Husain DO  Dx:   Encounter Diagnosis     ICD-10-CM    1  Adhesive capsulitis of left shoulder M75 02        Start Time: 791  Stop Time: 940  Total time in clinic (min): 50 minutes    Subjective: Only difficulty is reaching behind back  Notes that his R shoulder is becoming stiff  Objective: See treatment diary below      Assessment: ROM continues to improve  Focused on extension, IR and scap retract/T-spine rotation to  Improve ability to reach behind back  Tolerated treatment well  Patient would benefit from continued PT      Plan: Continue per plan of care                 Precautions: NONE      Daily Treatment Diary     Manual  19        PROM/mobs L sh nv JR JR JR JR        AAROM prone IR w/ ext   Heddy Shoe        PROM/AAROM sh ext     JR                                      Exercise Diary  19        Pendulums 3# 2' 3# 2' hep -- -- -- -- -- -- --   Cane :horiz add 10x 15x hep -- -- -- -- -- -- -   Scap retraction depression  10x10" hep -- -- -- -- -- -- --   Cane: sh flex 10x 15x hep -- -- -- -- -- -- --   Cane: sh ER 90/90 supine 10x hep hep -- -- -- -- -- -- --   Cane: sh scaption 10x 15x hep -- -- -- -- -- -- --   Cane: ext 10x  hep -- -- -- -- -- -- --   Scap punches  15x hep -- -- -- -- -- -- --   Towel stretch             Cane: ER/IR @ side  15x hep -- -- -- -- -- -- --   T-spine rot w/ IR    10xea 10xea        T-spine rot w/ reach leantable   10xea 10xea 10x        Standing scap/ flexion   nv 10xea  10x       Overhead pulley  3' 3' 3'  3'       butterflies  10x hep -- -- -- -- -- -- --   T-spine rot w/ reach in SL  10x 10x Stand 10x __ -- -- -- -- --   Prone ext/IR hand on back   10x 10x  10x AA       Prone ext      10 AA       Prone 1 arm row      23 15x2       B sh flex TB assist   10x 10 GTB 10x 10       TB abd assist 10xG 10x 10       TB row    10x2G 10x 10       UBE   3'/3' 3'/3' 3'/3' 3'/3'           Modalities              CP prn

## 2019-02-15 ENCOUNTER — OFFICE VISIT (OUTPATIENT)
Dept: PHYSICAL THERAPY | Facility: CLINIC | Age: 44
End: 2019-02-15
Payer: COMMERCIAL

## 2019-02-15 DIAGNOSIS — M75.02 ADHESIVE CAPSULITIS OF LEFT SHOULDER: Primary | ICD-10-CM

## 2019-02-15 PROCEDURE — 97112 NEUROMUSCULAR REEDUCATION: CPT | Performed by: PHYSICAL THERAPIST

## 2019-02-15 PROCEDURE — 97110 THERAPEUTIC EXERCISES: CPT | Performed by: PHYSICAL THERAPIST

## 2019-02-15 PROCEDURE — 97140 MANUAL THERAPY 1/> REGIONS: CPT | Performed by: PHYSICAL THERAPIST

## 2019-02-15 NOTE — PROGRESS NOTES
PT Re-Evaluation     Today's date: 2/15/2019  Patient name: Alethea Alexander  : 1975  MRN: [id number]  Referring provider: Jasmyne Sung DO  Dx:   Encounter Diagnosis     ICD-10-CM    1  Adhesive capsulitis of left shoulder M75 02        Start Time: 845  Stop Time:   Total time in clinic (min): 52 minutes    Assessment/Plan      This patient demonstrates improvement since beginning therapy; Range of Motion has increased and strength and function are improving  Pain has decreased  Patient is progressing toward LTG's and will benefit from continued therapy to reduce pain and restore function  Interventions to include manual therapy, ROM, stretching, strengthening, therapeutic activities, neuromuscular re-ed and instruction in HEP  Frequency: 1 times weekly   Duration:  4 weeks    STGs: 2-4 weeks  1  Increase ROM L shoulder by 10-20 degrees - MET  2  Decrease pain 2 levels - MET  3  LTGs: 6-8 weeks  1  Independent HEP - MET  2  Increase strength LUE to 5/5 throughout - partially met  3  Increase FOTO score to predicted level: 76 - partially met  4  Return to previous activities w/out pain >2/10 - MET  5  Increase AROM L shoulder flexion and abd to 160 degrees - MET  6  Increase ROM L shoulder ER to at least 50 degrees - MET  7           Able to reach behind back without dififculty _ added 2/15            Subjective   Reports shoulder continues to improve; most difficulty is reaching behind back; not too much pain  Patients goal: to restore full ROM, return to normal activities, including riding - partially met    Objective  Pain scale: 0-2/10  Cervical screen: WNL  Palpation: unremarkable      left  Shoulder  AROM  PROM  Strength  flexion 156 170 5/5   extension 50 50 5/5   Abduction 135 145 4+/5   Horiz Add   5/5   Horiz Abd   3-/5   ER 30 63 5/5   IR 45 45 5/5   Pain end range L shoulder ROM all planes    L elbow / wrist ROM/strength WNL    right extremity ROM / strength WFL            Precautions: NONE      Daily Treatment Diary     Manual  1/2/19 1/11 1/18 1/25 2/1 2/8 21/5      PROM/mobs L sh nv [facility]       VIKTORIA prone IR w/ ext   Laurie Smith        PROM/AAROM sh ext     JR                                      Exercise Diary  1/2/19 1/11 1/18 1/25 2/1 2/8 2/15      Pendulums 3# 2' 3# 2' hep -- -- -- -- -- -- --   Cane :horiz add 10x 15x hep -- -- -- -- -- -- -   Scap retraction depression  10x10" hep -- -- -- -- -- -- --   Cane: sh flex 10x 15x hep -- -- -- -- -- -- --   Cane: sh ER 90/90 supine 10x hep hep -- -- -- -- -- -- --   Cane: sh scaption 10x 15x hep -- -- -- -- -- -- --   Cane: ext 10x  hep -- -- -- -- -- -- --   Scap punches  15x hep -- -- -- -- -- -- --   SL abd       10x2 2#      Supine flexion       10x2 2#      SL ER       10x2 2#      Supine horiz abd/add       10x2 2#                   Cane: ER/IR @ side  15x hep -- -- -- -- -- -- --   T-spine rot w/ IR    10xea 10xea hep       T-spine rot w/ reach leantable   10xea 10xea 10x hep       Standing scap/ flexion   nv 10xea  10x       Overhead pulley  3' 3' 3'  3'       butterflies  10x hep -- -- -- -- -- -- --   T-spine rot w/ reach in SL  10x 10x Stand 10x __ -- -- -- -- --   Prone ext/IR hand on back   10x 10x  10x AA       Prone ext      10 AA 10x      Prone 1 arm row      2# 15x2 hep      B sh flex TB assist   10x 10 GTB 10x 10 20x      TB abd assist    10xG 10x 10 20x      TB row    10x2G 10x 10 hep      UBE   3'/3' 3'/3' 3'/3' 3'/3' 3'/3'          Modalities              CP prn

## 2019-02-25 ENCOUNTER — OFFICE VISIT (OUTPATIENT)
Dept: PHYSICAL THERAPY | Facility: CLINIC | Age: 44
End: 2019-02-25
Payer: COMMERCIAL

## 2019-02-25 DIAGNOSIS — M75.02 ADHESIVE CAPSULITIS OF LEFT SHOULDER: Primary | ICD-10-CM

## 2019-02-25 PROCEDURE — 97110 THERAPEUTIC EXERCISES: CPT | Performed by: PHYSICAL THERAPIST

## 2019-02-25 PROCEDURE — 97140 MANUAL THERAPY 1/> REGIONS: CPT | Performed by: PHYSICAL THERAPIST

## 2019-02-25 PROCEDURE — 97112 NEUROMUSCULAR REEDUCATION: CPT | Performed by: PHYSICAL THERAPIST

## 2019-02-25 NOTE — PROGRESS NOTES
Daily Note     Today's date: 2019  Patient name: Julita Stephens  : 1975  MRN: [id number]  Referring provider: Reford Apgar, DO  Dx:   Encounter Diagnosis     ICD-10-CM    1  Adhesive capsulitis of left shoulder M75 02                   Subjective: reports shoulder keeps improving; has returned to some wt training at gym - surprised at how weak his arm had become; reprots he was able to scratch his back with his L arm; states that the other shoulder is beginning to bother him like this one did in the beginning      Objective: See treatment diary below      Assessment: Tolerated treatment well  Patient would benefit from continued PT      Plan: Continue per plan of care  Decrease to every other week              Precautions: NONE      Daily Treatment Diary     Manual  19     PROM/mobs L sh nv [address] prone IR w/ ext   Obadiah Campanile        PROM/AAROM sh ext     JR                                      Exercise Diary  1/2/19 1/11 1/18 1/25 2/1 2/8 2/15 2/25     Pendulums 3# 2' 3# 2' hep -- -- -- -- -- -- --   Cane :horiz add 10x 15x hep -- -- -- -- -- -- -   Scap retraction depression  10x10" hep -- -- -- -- -- -- --   Cane: sh flex 10x 15x hep -- -- -- -- -- -- --   Cane: sh ER 90/90 supine 10x hep hep -- -- -- -- -- -- --   Cane: sh scaption 10x 15x hep -- -- -- -- -- -- --   Cane: ext 10x  hep -- -- -- -- -- -- --   Scap punches  15x hep -- -- -- -- -- -- --   SL abd       10x2 2# 15x2 2#     Supine flexion       10x2 2# 15x2 2#     SL ER       10x2 2# 15x2 2#     Supine horiz abd/add       10x2 2# 15x2 2#                  Cane: ER/IR @ side  15x hep -- -- -- -- -- -- --   T-spine rot w/ IR    10xea 10xea hep       T-spine rot w/ reach leantable   10xea 10xea 10x hep       Standing scap/ flexion   nv 10xea  10x  10x ea 2#     Overhead pulley  3' 3' 3'  3'       butterflies  10x hep -- -- -- -- -- -- --   T-spine rot w/ reach in SL  10x 10x Stand 10x __ -- -- -- -- --   Prone ext/IR hand on back   10x 10x  10x AA  TB assist 20     Prone ext      10 AA 10x 15x2 2#     Prone 1 arm row      2# 15x2 hep      pec stretch doorway        30"x3     B sh flex TB assist   10x 10 GTB 10x 10 20x 20     TB abd assist    10xG 10x 10 20x 20     TB row    10x2G 10x 10 hep --     UBE   3'/3' 3'/3' 3'/3' 3'/3' 3'/3' 3'/3'         Modalities              CP prn

## 2019-03-08 ENCOUNTER — OFFICE VISIT (OUTPATIENT)
Dept: PHYSICAL THERAPY | Facility: CLINIC | Age: 44
End: 2019-03-08
Payer: COMMERCIAL

## 2019-03-08 DIAGNOSIS — M75.02 ADHESIVE CAPSULITIS OF LEFT SHOULDER: Primary | ICD-10-CM

## 2019-03-08 PROCEDURE — 97140 MANUAL THERAPY 1/> REGIONS: CPT | Performed by: PHYSICAL THERAPIST

## 2019-03-08 PROCEDURE — 97112 NEUROMUSCULAR REEDUCATION: CPT | Performed by: PHYSICAL THERAPIST

## 2019-03-08 NOTE — PROGRESS NOTES
PT Re-Evaluation  and PT Discharge    Today's date: 3/8/2019  Patient name: Karon Mobley  : 1975  MRN: [id number]  Referring provider: Sharifa Dubon DO  Dx:   Encounter Diagnosis     ICD-10-CM    1  Adhesive capsulitis of left shoulder M75 02        Start Time: 1400  Stop Time: 1448  Total time in clinic (min): 48 minutes    Assessment/Plan  This patient demonstrates improvement since beginning therapy and has met the LTG's which were set for him  He is independent with HEP and is ready for DC to self-directed program     STGs: 2-4 weeks  1  Increase ROM L shoulder by 10-20 degrees - MET  2  Decrease pain 2 levels - MET  3  LTGs: 6-8 weeks  1  Independent HEP - MET  2  Increase strength LUE to 5/5 throughout - MET  3  Increase FOTO score to predicted level: 76 - MET  4  Return to previous activities w/out pain >2/10 - MET  5  Increase AROM L shoulder flexion and abd to 160 degrees - MET  6  Increase ROM L shoulder ER to at least 50 degrees - MET  7  Able to reach behind back without dififculty _ added 2/15 - MET            Subjective   Reports continued improvement in L shoulder  Is now able to reach behind back without difficulty   Not having pain in the L shoulder with usual activities  Is having increasing pain / stiffness in the R shoulder - states that the same thing is happening to that as happened to the L    Patients goal: to restore full ROM, return to normal activities, including riding - MET for L shoulder    Objective  Pain scale: 0/10  Cervical screen: WNL  Palpation: unremarkable      left  Shoulder  AROM  PROM  Strength  flexion 168 175 5/5   extension 50 50 5/5   Abduction 170 175 5/5   Horiz Add   5/5   Horiz Abd   5/5   ER 80 90 5/5   IR 55 60 5/5       L elbow / wrist ROM/strength WNL    right extremity ROM / strength WFL                    Precautions: NONE      Daily Treatment Diary     Manual  19 21 2/25 3/8    PROM/mobs L  nv [address] prone IR w/ ext   Alyssa Arts        PROM/AAROM sh ext     JR        PNF L          JR                     Exercise Diary  1/2/19 1/11 1/18 1/25 2/1 2/8 2/15 2/25 3/8    Pendulums 3# 2' 3# 2' hep -- -- -- -- -- -- --   Cane :horiz add 10x 15x hep -- -- -- -- -- -- -   Scap retraction depression  10x10" hep -- -- -- -- -- -- --   Cane: sh flex 10x 15x hep -- -- -- -- -- -- --   Cane: sh ER 90/90 supine 10x hep hep -- -- -- -- -- -- --   Cane: sh scaption 10x 15x hep -- -- -- -- -- -- --   Cane: ext 10x  hep -- -- -- -- -- -- --   Scap punches  15x hep -- -- -- -- -- -- --   SL abd       10x2 2# 15x2 2# hep    Supine flexion       10x2 2# 15x2 2# hep    SL ER       10x2 2# 15x2 2# hep    Supine horiz abd/add       10x2 2# 15x2 2# hep    TB LPD         GTB 20    Cane: ER/IR @ side  15x hep -- -- -- -- -- -- --   T-spine rot w/ IR    10xea 10xea hep       T-spine rot w/ reach leantable   10xea 10xea 10x hep       Standing scap/ flexion   nv 10xea  10x  10x ea 2#     Overhead pulley  3' 3' 3'  3'       butterflies  10x hep -- -- -- -- -- -- --   T-spine rot w/ reach in SL  10x 10x Stand 10x __ -- -- -- -- --   Prone ext/IR hand on back   10x 10x  10x AA  TB assist 20 hep    Prone ext      10 AA 10x 15x2 2# 15x2    Modified bird dog         10x    Prone 1 arm row      2# 15x2 hep  horiz abd 15x2    pec stretch doorway        30"x3 30"x3    B sh flex TB assist   10x 10 GTB 10x 10 20x 20 hep    TB abd assist    10xG 10x 10 20x 20 hep    TB row    10x2G 10x 10 hep -- 10x    UBE   3'/3' 3'/3' 3'/3' 3'/3' 3'/3' 3'/3' 3'/3'        Modalities              CP prn

## 2019-03-15 ENCOUNTER — APPOINTMENT (OUTPATIENT)
Dept: PHYSICAL THERAPY | Facility: CLINIC | Age: 44
End: 2019-03-15
Payer: COMMERCIAL

## 2019-03-22 ENCOUNTER — APPOINTMENT (OUTPATIENT)
Dept: PHYSICAL THERAPY | Facility: CLINIC | Age: 44
End: 2019-03-22
Payer: COMMERCIAL

## 2019-03-29 ENCOUNTER — APPOINTMENT (OUTPATIENT)
Dept: PHYSICAL THERAPY | Facility: CLINIC | Age: 44
End: 2019-03-29
Payer: COMMERCIAL

## 2019-05-18 ENCOUNTER — HOSPITAL ENCOUNTER (EMERGENCY)
Facility: HOSPITAL | Age: 44
Discharge: HOME | End: 2019-05-18
Admitting: EMERGENCY MEDICINE
Payer: COMMERCIAL

## 2019-05-18 ENCOUNTER — APPOINTMENT (EMERGENCY)
Dept: RADIOLOGY | Facility: HOSPITAL | Age: 44
End: 2019-05-18
Payer: COMMERCIAL

## 2019-05-18 VITALS
SYSTOLIC BLOOD PRESSURE: 139 MMHG | HEIGHT: 70 IN | WEIGHT: 155 LBS | TEMPERATURE: 98.1 F | RESPIRATION RATE: 22 BRPM | DIASTOLIC BLOOD PRESSURE: 93 MMHG | HEART RATE: 113 BPM | BODY MASS INDEX: 22.19 KG/M2 | OXYGEN SATURATION: 97 %

## 2019-05-18 DIAGNOSIS — S90.32XA CONTUSION OF LEFT FOOT: Primary | ICD-10-CM

## 2019-05-18 PROCEDURE — 73630 X-RAY EXAM OF FOOT: CPT

## 2019-05-18 PROCEDURE — 99282 EMERGENCY DEPT VISIT SF MDM: CPT | Performed by: PHYSICIAN ASSISTANT

## 2019-05-18 PROCEDURE — 99283 EMERGENCY DEPT VISIT LOW MDM: CPT

## 2019-07-19 ENCOUNTER — OFFICE VISIT (OUTPATIENT)
Dept: OBGYN CLINIC | Facility: CLINIC | Age: 44
End: 2019-07-19
Payer: COMMERCIAL

## 2019-07-19 VITALS
SYSTOLIC BLOOD PRESSURE: 124 MMHG | HEART RATE: 81 BPM | HEIGHT: 70 IN | WEIGHT: 155 LBS | DIASTOLIC BLOOD PRESSURE: 84 MMHG | BODY MASS INDEX: 22.19 KG/M2

## 2019-07-19 DIAGNOSIS — M75.02 ADHESIVE CAPSULITIS OF LEFT SHOULDER: Primary | ICD-10-CM

## 2019-07-19 PROCEDURE — 99213 OFFICE O/P EST LOW 20 MIN: CPT | Performed by: ORTHOPAEDIC SURGERY

## 2019-07-19 NOTE — LETTER
July 19, 2019     Patient: Tosha Flores   YOB: 1975   Date of Visit: 7/19/2019       To Whom it May Concern:    Mindy Isaac is under my professional care  He was seen in my office on 7/19/2019  He may return to work without limitations  If you have any questions or concerns, please don't hesitate to call  Sincerely,          Meena Martinez DO        CC: Adrienne Isidro

## 2019-07-19 NOTE — PROGRESS NOTES
Ortho Sports Medicine Shoulder Follow Up Visit     Assesment:   37year old Male Left shoulder resolved ahesive capsulitis    Plan:    Conservative treatment:    Ice to shoulder 1-2 times daily, for 20 minutes at a time  Let pain guide return to activities  Imaging:    No imaging was available for review today  Injection:    No Injection planned at this time  Surgery:     No surgery is recommended at this point, continue with conservative treatment plan as noted  Follow up:    No follow-ups on file  Chief Complaint   Patient presents with    Left Shoulder - Follow-up         History of Present Illness: The patient is returns for follow up of his left shoulder  Since the prior visit, He reports significant improvement  Physical therapy has completely improved his range of motion  Pain is improved by rest and physical therapy  Pain is aggravated by overhead activity  The patient denies weakness  The patient has tried rest, ice, NSAIDS and physical therapy  I have reviewed the past medical, surgical, social and family history, medications and allergies as documented in the EMR  Review of systems: ROS is negative other than that noted in the HPI  Constitutional: Negative for fatigue and fever  Physical Exam:    There were no vitals taken for this visit      General/Constitutional: NAD, well developed, well nourished  HENT: Normocephalic, atraumatic  CV: Intact distal pulses, regular rate  Resp: No respiratory distress or labored breathing  Lymphatic: No lymphadenopathy palpated  Neuro: Alert and Oriented x 3, no focal deficits  Psych: Normal mood, normal affect, normal judgement, normal behavior  Skin: Warm, dry, no rashes, no erythema      Shoulder focused exam:       RIGHT LEFT    Scapula Atrophy Negative Negative     Winging Negative Negative     Protraction Negative Negative    Rotator cuff SS 5/5 5/5     IS 5/5 5/5     SubS 5/5 5/5    ROM  170     ER0 60 60     ER90 90    90     IR90 T6    T6     IRb T6    T6    TTP: AC Negative Negative     Biceps Negative Negative     Coracoid Negative Negative    Special Tests: O'Briens Negative Negative     Nguyen-shear Negative Negative     Cross body Adduction Negative Negative     Speeds  Negative Negative     Zaida's Negative Negative     Whipple Negative Negative       Neer Negative Negative     Martines Negative Negative    Instability: Apprehension & relocation not tested not tested     Load & shift not tested not tested    Other: Crank Negative Negative               UE NV Exam: +2 Radial pulses bilaterally  Sensation intact to light touch C5-T1 bilaterally, Radial/median/ulnar nerve motor intact    Cervical ROM is full without pain, numbness or tingling      Shoulder Imaging    No imaging was performed today

## 2021-05-03 ENCOUNTER — IMMUNIZATIONS (OUTPATIENT)
Dept: FAMILY MEDICINE CLINIC | Facility: HOSPITAL | Age: 46
End: 2021-05-03

## 2021-05-03 DIAGNOSIS — Z23 ENCOUNTER FOR IMMUNIZATION: Primary | ICD-10-CM

## 2021-05-03 PROCEDURE — 0001A SARS-COV-2 / COVID-19 MRNA VACCINE (PFIZER-BIONTECH) 30 MCG: CPT

## 2021-05-03 PROCEDURE — 91300 SARS-COV-2 / COVID-19 MRNA VACCINE (PFIZER-BIONTECH) 30 MCG: CPT

## 2021-05-26 ENCOUNTER — IMMUNIZATIONS (OUTPATIENT)
Dept: FAMILY MEDICINE CLINIC | Facility: HOSPITAL | Age: 46
End: 2021-05-26

## 2021-05-26 DIAGNOSIS — Z23 ENCOUNTER FOR IMMUNIZATION: Primary | ICD-10-CM

## 2021-05-26 PROCEDURE — 91300 SARS-COV-2 / COVID-19 MRNA VACCINE (PFIZER-BIONTECH) 30 MCG: CPT

## 2021-05-26 PROCEDURE — 0002A SARS-COV-2 / COVID-19 MRNA VACCINE (PFIZER-BIONTECH) 30 MCG: CPT

## 2021-12-29 ENCOUNTER — IMMUNIZATIONS (OUTPATIENT)
Dept: FAMILY MEDICINE CLINIC | Facility: HOSPITAL | Age: 46
End: 2021-12-29

## 2021-12-29 DIAGNOSIS — Z23 ENCOUNTER FOR IMMUNIZATION: Primary | ICD-10-CM

## 2021-12-29 PROCEDURE — 91306 COVID-19 MODERNA VACC 0.25 ML BOOSTER: CPT

## 2021-12-29 PROCEDURE — 0064A COVID-19 MODERNA VACC 0.25 ML BOOSTER: CPT

## 2022-08-29 ENCOUNTER — OFFICE VISIT (OUTPATIENT)
Dept: CARDIOLOGY CLINIC | Facility: CLINIC | Age: 47
End: 2022-08-29
Payer: COMMERCIAL

## 2022-08-29 VITALS
HEIGHT: 70 IN | BODY MASS INDEX: 25.7 KG/M2 | SYSTOLIC BLOOD PRESSURE: 140 MMHG | HEART RATE: 83 BPM | DIASTOLIC BLOOD PRESSURE: 88 MMHG | OXYGEN SATURATION: 97 % | WEIGHT: 179.5 LBS

## 2022-08-29 DIAGNOSIS — R00.0 TACHYCARDIA: Primary | ICD-10-CM

## 2022-08-29 DIAGNOSIS — R06.02 SHORTNESS OF BREATH: ICD-10-CM

## 2022-08-29 PROCEDURE — 99204 OFFICE O/P NEW MOD 45 MIN: CPT | Performed by: INTERNAL MEDICINE

## 2022-08-29 PROCEDURE — 93000 ELECTROCARDIOGRAM COMPLETE: CPT | Performed by: INTERNAL MEDICINE

## 2022-08-29 RX ORDER — ALBUTEROL SULFATE 90 UG/1
AEROSOL, METERED RESPIRATORY (INHALATION)
COMMUNITY
Start: 2022-07-11

## 2022-08-29 RX ORDER — METHYLPHENIDATE HYDROCHLORIDE 27 MG/1
TABLET ORAL EVERY 24 HOURS
COMMUNITY
Start: 2022-06-14

## 2022-08-29 NOTE — PATIENT INSTRUCTIONS
You were seen today in the Cardiology office for evaluation of shortness of breath and palpitations  We discussed the benefits of (weight loss, smoking cessation, healthy low-fat diet, salt reduction, fluid restriction)  Please talk to your prescribing provider about alternatives to Concerta  Thank you for choosing [address]  Please call our office or use katena with any questions

## 2022-08-29 NOTE — PROGRESS NOTES
[facility]    Name:Fam Matthew   DOS: 8/29/2022     Chief Complaint:   Chief Complaint   Patient presents with    Advice Only     Exertional sob       Tachycardia         Cough       HISTORY OF PRESENT ILLNESS:      HPI:  Paz Che is a 48 y o  male  He  has a past medical history of ADHD (attention deficit hyperactivity disorder), Asperger's syndrome, and Myoclonic jerking  He reports that he has been experiencing shortness of breath and "a fast heart rate" for a few months now, particularly worse more recently  The tachycardia occurs in the absence of palpitations, and he tells me that he notices an elevated heart rate on his smart watch which prompts him to be more aware of his heart rate  He reports that these symptoms occur in episodes, and he can sometimes go weeks without experiencing an episode  He denies associated chest pain, diaphoresis, dizziness, palpitations, orthopnea, PND, edema, syncope  He currently uses Albuterol PRN for prevention of pneumonia and chronic dry cough, but does not have a formal diagnosis of restrictive or obstructive lung disease  He has been taking Concerta for the past 3-5 years, and tells me that he did not taking any medication for ADHD prior to that time  He is a prior smoker, but does not actively smoke  He denies recreational drug use  He was adopted and is not completely aware of his family history  He has no known personal history of heart disease in the past       ROS    ROS: Pertinent positives and negatives as described in History of Present Illness  Remainder of a 14 point review of systems was negative       Allergies   Allergen Reactions    Penicillins         Current Outpatient Medications on File Prior to Visit   Medication Sig Dispense Refill    albuterol (PROVENTIL HFA,VENTOLIN HFA) 90 mcg/act inhaler INHALE 1-2 PUFFS AS NEEDED EVERY 4 HOURS 30      guanFACINE (TENEX) 2 MG tablet Take 3 mg by mouth daily at bedtime  methylphenidate (CONCERTA) 27 MG ER tablet Take 27 mg by mouth daily      methylphenidate (CONCERTA) 27 MG ER tablet every 24 hours      [DISCONTINUED] ARIPiprazole (ABILIFY) 5 mg tablet Take 7 mg by mouth daily       No current facility-administered medications on file prior to visit  Past Medical History:   Diagnosis Date    ADHD (attention deficit hyperactivity disorder)     Asperger's syndrome     Myoclonic jerking        Past Surgical History:   Procedure Laterality Date    BREAST LUMPECTOMY Left     HAND TENDON SURGERY Right 12/14/2016    Procedure: REPAIR EXTENSOR TENDON RIGHT THUMB ;  Surgeon: Belen Arellano MD;  Location: [facility] OR;  Service:        Family History   Adopted: Yes       Social History     Socioeconomic History    Marital status: Single     Spouse name: Not on file    Number of children: Not on file    Years of education: Not on file    Highest education level: Not on file   Occupational History    Not on file   Tobacco Use    Smoking status: Former Smoker    Smokeless tobacco: Never Used   Substance and Sexual Activity    Alcohol use:  Yes    Drug use: No    Sexual activity: Not on file   Other Topics Concern    Not on file   Social History Narrative    Not on file     Social Determinants of Health     Financial Resource Strain: Not on file   Food Insecurity: Not on file   Transportation Needs: Not on file   Physical Activity: Not on file   Stress: Not on file   Social Connections: Not on file   Intimate Partner Violence: Not on file   Housing Stability: Not on file       OBJECTIVE:    /88 (BP Location: Left arm, Patient Position: Sitting, Cuff Size: Standard)   Pulse 83   Ht 5' 10" (1 778 m)   Wt 81 4 kg (179 lb 8 oz)   SpO2 97%   BMI 25 76 kg/m²      BP Readings from Last 3 Encounters:   08/29/22 140/88   07/19/19 124/84   05/18/19 139/93       Wt Readings from Last 3 Encounters:   08/29/22 81 4 kg (179 lb 8 oz)   07/19/19 70 3 kg (155 lb)   05/18/19 70 3 kg (155 lb)         Physical Exam  Vitals reviewed  Constitutional:       General: He is not in acute distress  Appearance: Normal appearance  He is not diaphoretic  HENT:      Head: Normocephalic and atraumatic  Eyes:      Conjunctiva/sclera: Conjunctivae normal    Neck:      Vascular: No carotid bruit or JVD  Cardiovascular:      Rate and Rhythm: Normal rate and regular rhythm  Pulses: Normal pulses  Heart sounds: Normal heart sounds  No murmur heard  No friction rub  No gallop  Abdominal:      General: Abdomen is flat  Bowel sounds are normal  There is no distension  Palpations: Abdomen is soft  Musculoskeletal:      Right lower leg: No edema  Left lower leg: No edema  Skin:     General: Skin is warm and dry  Comments: Contusion on the right shin  Patient states 2/2 pet bite  Neurological:      General: No focal deficit present  Mental Status: He is alert and oriented to person, place, and time  Psychiatric:         Mood and Affect: Mood normal          Behavior: Behavior normal                                                          LABS:  No results found for: GLUCOSE, BUN, CREATININE, CALCIUM, NA, K, CO2, CL, ALKPHOS, BILITOT, PROT, AST, ALT, ANIONGAP     No results found for: WBC, HGB, HCT, MCV, PLT    No results found for: CHOL, HDL, LDLCALC, TRIG    No results found for: HGBA1C    No results found for: TSH    IMAGING   ECG 8/29/22: Normal sinus rhythm  ASSESSMENT/PLAN:  Diagnoses and all orders for this visit:    Tachycardia  Shortness of breath  - Unclear etiology, however strong suspicion that his dyspnea and tachycardia are related to prescribed methamphetamine use (Concerta)  - His tachycardia is somewhat infrequent as noted above, and I do not think that ambulatory rhythm monitoring would be of high yield at this time   We discussed at length the importance of close follow up and need for rhythm monitoring if his tachycardia becomes more frequent  There is no exam evidence of heart failure or volume overload to suggest a tachycardia mediated cardiomyopathy    - Will obtain a transthoracic echocardiogram to evaluate for structural pathology that may be causing his symptoms  Additionally, I'd like to ensure that he has not developed pulmonary hypertension from long-term amphetamine use as this is a known long-term complication of this medication class    - Advised patient to follow up with the provider that prescribes his Concerta to discuss alternatives to this therapy  Preferably non-stimulant medications  - He prefers to follow up here only as needed if his testing is abnormal or if his symptoms worsen in severity or frequency  -     POCT ECG  -     Echo complete w/ contrast if indicated; Future      Other orders  -     albuterol (PROVENTIL HFA,VENTOLIN HFA) 90 mcg/act inhaler; INHALE 1-2 PUFFS AS NEEDED EVERY 4 HOURS 30  -     methylphenidate (CONCERTA) 27 MG ER tablet; every 24 hours            The patient has an erythematous bite wound on his right shin  The bite does not appear acute, and the area is dry but warm to the touch with surrounding erythema  Ruddy Tyler was advised to follow up with his PCP or an urgent care to discuss this injury as he may require antibiotic therapy  He expressed understanding, and agreed to seek further evaluation with his PCP       Abi Oliva MD

## 2022-09-01 ENCOUNTER — CONSULT (OUTPATIENT)
Dept: PULMONOLOGY | Facility: HOSPITAL | Age: 47
End: 2022-09-01
Payer: COMMERCIAL

## 2022-09-01 VITALS
HEART RATE: 87 BPM | WEIGHT: 179.6 LBS | HEIGHT: 70 IN | TEMPERATURE: 97.8 F | DIASTOLIC BLOOD PRESSURE: 80 MMHG | BODY MASS INDEX: 25.71 KG/M2 | SYSTOLIC BLOOD PRESSURE: 140 MMHG | OXYGEN SATURATION: 97 %

## 2022-09-01 DIAGNOSIS — R06.00 DYSPNEA, UNSPECIFIED TYPE: Primary | ICD-10-CM

## 2022-09-01 DIAGNOSIS — J30.9 ALLERGIC RHINITIS, UNSPECIFIED SEASONALITY, UNSPECIFIED TRIGGER: ICD-10-CM

## 2022-09-01 DIAGNOSIS — R06.02 SHORTNESS OF BREATH: ICD-10-CM

## 2022-09-01 DIAGNOSIS — R05.9 COUGH: ICD-10-CM

## 2022-09-01 PROCEDURE — 99204 OFFICE O/P NEW MOD 45 MIN: CPT | Performed by: INTERNAL MEDICINE

## 2022-09-01 RX ORDER — AZELASTINE HYDROCHLORIDE, FLUTICASONE PROPIONATE 137; 50 UG/1; UG/1
1 SPRAY, METERED NASAL 2 TIMES DAILY
Qty: 23 G | Refills: 6 | Status: SHIPPED | OUTPATIENT
Start: 2022-09-01

## 2022-09-01 RX ORDER — GUANFACINE 3 MG/1
TABLET, EXTENDED RELEASE ORAL
COMMUNITY
Start: 2022-08-30

## 2022-09-01 NOTE — ASSESSMENT & PLAN NOTE
Prescribed nasal spray (fluticasone-azelastine) to be used to relieve upper airway inflammation and nasal congestion      He will continue over-the-counter antihistamine

## 2022-09-01 NOTE — ASSESSMENT & PLAN NOTE
Productive cough that has been persistent for months  Sputum is clear  Suspect cough is related to reactive airway disease, postnasal drip from allergic rhinitis  I will order chest X-ray PA lateral to rule out any obvious acute or chronic lung diseases that may be causing his cough    I suspect he does have reactive airway disease and has prescribed Breo 200-25 1 puff daily  He should continue with albuterol p r n  This is unsuccessful in controlling his symptoms we can consider adding Singulair or Spiriva  He needs better control of his allergic rhinitis    He does not seem to have any see significant acid reflux

## 2022-09-01 NOTE — ASSESSMENT & PLAN NOTE
Unclear if dyspnea is related to reactive airway disease  He will acquire chest x-ray PA lateral which should rule out obvious lung disease  If he has any abnormalities on the chest x-ray he will require a CT scan  He is able to exert himself during virtual reality video games without significant dyspnea however with prolonged persistent aerobic activity such as stair climbing he gets dyspneic        If initial workup is negative and he does not respond to ICS/LABA then we will order pulmonary function testing and/or cross-sectional imaging

## 2022-09-01 NOTE — PROGRESS NOTES
Your Child's Health  7-8 Year-Old Visit      Jonathan Acevedo  May 27, 2021    Visit Vitals  /60   Ht 4' 2.5\" (1.283 m)   Wt 33.7 kg   BMI 20.46 kg/m²     Weight: 74.2 lbs      YOUR CHILD'S 7 and 8 YEAR-OLD VISITS      School / Development / Behavior   Children should be well-adjusted in their school setting this at age. Success in school depends on several skills--communication skills, cooperation, attention, cognitive ability. Problems in one area may affect a child’s overall school experience. It is very important to stay in touch with teachers in order to identify and address any problems as soon as they are recognized. To help your child learn well, be sure they are well rested and have a healthy breakfast every morning.    Children need to be in school if they are going to learn and advance. Missing school frequently will lead to a lot of problems for a child; this needs to be addressed if it is happening. If your child receives any extra services through an IEP, make sure the IEP is reviewed regularly and updated if needed.     With increasing age comes increasing opportunities for activities outside of school (sports, arts, scouting).  Being part of a peer group becomes more important to children as they are growing older. They may encounter friends with different values and beliefs. Discuss differences openly with your children to help them start to understand the diversity of our society. Always listen without interrupting. Your children may still need reminding of the rules and expectations which you have for them, but they are developing more of a conscience and awareness of right and wrong which will affect how they view rules and social expectations. Discuss what consequences are for not following family rules--make sure they are reasonable and be consistent in enforcing them.     Children should have some definite responsibilities at this age. Simple household tasks like making their bed,  Pulmonary Outpatient Note   Aby Lane 48 y o  male MRN: [id number]  9/1/2022      Referring Physician: No ref  provider found    Reason for Consultation:    Chief Complaint   Patient presents with    Cough    Shortness of Breath    Breathing Problem         Assessment/Plan:    1  Dyspnea, unspecified type  -     XR chest pa & lateral; Future; Expected date: 09/01/2022  -     Azelastine-Fluticasone 137-50 MCG/ACT SUSP; 1 spray into each nostril 2 (two) times a day  -     fluticasone-vilanterol (Breo Ellipta) 200-25 MCG/INH inhaler; Inhale 1 puff daily Rinse mouth after use  2  Cough  Assessment & Plan:  Productive cough that has been persistent for months  Sputum is clear  Suspect cough is related to reactive airway disease, postnasal drip from allergic rhinitis  I will order chest X-ray PA lateral to rule out any obvious acute or chronic lung diseases that may be causing his cough    I suspect he does have reactive airway disease and has prescribed Breo 200-25 1 puff daily  He should continue with albuterol p r n  This is unsuccessful in controlling his symptoms we can consider adding Singulair or Spiriva  He needs better control of his allergic rhinitis  He does not seem to have any see significant acid reflux      3  Shortness of breath  Assessment & Plan:  Unclear if dyspnea is related to reactive airway disease  He will acquire chest x-ray PA lateral which should rule out obvious lung disease  If he has any abnormalities on the chest x-ray he will require a CT scan  He is able to exert himself during virtual reality video games without significant dyspnea however with prolonged persistent aerobic activity such as stair climbing he gets dyspneic  If initial workup is negative and he does not respond to ICS/LABA then we will order pulmonary function testing and/or cross-sectional imaging      4   Allergic rhinitis, unspecified seasonality, unspecified trigger  Assessment & Plan:  Prescribed nasal spray (fluticasone-azelastine) to be used to relieve upper airway inflammation and nasal congestion  He will continue over-the-counter antihistamine          Health Maintenance  Immunization History   Administered Date(s) Administered    COVID-19 MODERNA VACC 0 25 ML IM BOOSTER 2021    COVID-19 PFIZER VACCINE 0 3 ML IM 2021, 2021    Influenza Quadrivalent Preservative Free 3 years and older IM 2017, 2018, 10/21/2019, 10/29/2020, 2021    Influenza, seasonal, injectable, preservative free 2015    Tdap 2015, 2016        Return in about 3 months (around 2022)  History of Present Illness   HPI:  Mo Marshall is a 48 y o  male w/ a hx of ADHD, Asperger's syndome, who presents for evaluation of cough over the past few months that is productive and persistent  Patient is presenting for evaluation of a cough that has been persistent for months  This cough is productive of clear mucus  Overnight when he wakes up he usually has mucus buildup in the back of his throat that he has to clear  He feels a itchiness sensation in his chest at times  He has a p r n  albuterol inhaler which loosens up the mucus and improved expectoration but requires 3 puffs to accomplish this  He has used Breo in the past when such coughing spells have occurred which some success  This type of prolonged duration of cough has occurred intermittently in recent years but this is the longest duration  He was born premature , but unsure of weeks of prematurity or if he required  intensive care  He is unsure of his family history or exact birth details because he is adopted  As a child he apparently was hospitalized for respiratory infections  When playing sports as a child he would have significant coughing  He never used an albuterol inhaler or any other inhalers as a child  He is more dyspneic with exertion in recent years    He setting the table, helping with meals or simple household chores should be an expectation. Tell your child that you notice and appreciate what they are doing so that they become more confident and ready to take on more responsibility as time goes by. Children are motivated to do well when their parents provide a lot of encouragement. Make sure to find time every day for just talking with your children (no TV, no phone, no music!). Be a good role model for them by always acting responsibly, keep promises, and being on time.     Ask your child if they feel safe at school. Bullying is common--it is difficult to know exactly how common it is, but most children probably experience some bullying during their school years. Bullying hurts everyone--the child who is bullied, children who witness it, and the person doing the bullying (those children are likely to develop long term behavior and self-esteem issues). Children should know to report to you and/or teachers if they are being teased or bullied or if they witness another child being bullied. Bullying in schools (or anywhere) should not be tolerated. Talk to teachers, administrators or guidance counselors at the school to help with this issue. Good online resources regarding bullying are StopBullying.gov and the American Academy of Pediatrics \"HealthyChildren.org\" website (search for \"Bullying\"). These websites include guidelines that may be useful to both parents and children.      Health and Safety in (and out of!) the Home  Smoking: Continue to protect your child from cigarette smoke; secondhand smoke increases their risk of heart and lung disease. Vapors from e-cigarettes may also be harmful, so do not use those in your home or around children. If you smoke and are ready to consider quitting, talk to your doctor. Nicotine replacement products can be very helpful in breaking this tough addiction. 7-800-QUIT-NOW is a national help line that can help you find  has gained weight since the beginning of the pandemic  He has shifted to mainly working from home over this time  His main concern with dyspnea is going up stairs however he is able to play very active VR games without significant noticeable dyspnea  Even when he was working outside of his home he says that he was not particularly active  He does not go to a gym  His work is mainly sedentary at a desk  He has worked as a   He does not have any significant inhalation or occupational exposures from work  He used to work in [place] but now primarily works at home  He smoked years ago but had <5 pack year smoking hx  During this period of time he has lived in the same house, with no changes to margarito, walls, carpeting  He has a dog at home that sheds and dog danders noticeable on his shirt today  He has exposure to horses but does not manage or process hay  No hot tubs/humidifiers  No mold exposure  No water damage at home  He has a feather pillow but has had that for years    He does have upper airway mucus production without rhinorrhea  He cites that he has more sinus congestion and requires occasional blowing of the nose or sniffing to relieve mucus congestion in the back of his nose  No nosebleeds  No hemoptysis  He uses over-the-counter antihistamines  No nasal sprays    He has never been hospitalized or required ICU level care for respiratory problems  He has not been on oral corticosteroids in the past     He does have itchy eyes and redness of the eyes at times  No significant acid reflux    Review of Systems   Constitutional: Negative for activity change, appetite change, chills, diaphoresis, fever and unexpected weight change  HENT: Negative for ear discharge, ear pain, nosebleeds, postnasal drip and sore throat  Eyes: Positive for discharge and itching  Negative for pain, redness and visual disturbance     Respiratory: Positive for cough, chest tightness resources; other resources can be found at cdc.gov.    Safety on the Internet: Just as you would not allow your child to go anywhere they want outside, they should not be allowed to “wander” the internet on their own. Keep the computer where you can observe your child’s use. Make sure you know how to check the internet history and do it regularly. If possible, set up a safety filter which will prevent your child from accessing inappropriate sites.      Dental Health: Your child should be brushing at least twice daily for 2 minutes at a time with a pea-sized amount of regular (fluoridated) toothpaste. Make flossing a regular part of their dental routine at this age. Most children need a parent’s help to make sure all of their back teeth are brushed well until they are ~8 years old. Hopefully they have seen a dentist by this age; look for a one now if you do not already have one. Let our office know if you use water from a private well; testing for fluoride content is recommended to determine if fluoride supplements are needed. Limiting candy, other sweets, juice and sticky/chewy foods remains important for their dental (and overall!) health.    Healthy Eating: Eat meals together as a family and talk during meals. (Leave the television off and do not allow phones or other electronics at the table.) For in between meals, keep nutritious choices around for snack times (fresh fruits and vegetables, string cheese, whole-grain crackers, yogurt, hard-boiled eggs, nuts).School-age children need 3 servings of good sources of calcium daily; this can include lowfat (or skim) milk, yogurt, low fat cheese or foods which have been fortified with calcium.  They should also get 600 IU of vitamin D daily which (along with appropriate calcium intake) ensures good bone health. Most people cannot meet their vitamin D needs with their usual diet, so a multivitamin with iron supplement is a good way to get it. (A pure vitamin D supplement  and shortness of breath  Negative for wheezing and stridor  Cardiovascular: Negative for chest pain, palpitations and leg swelling  Gastrointestinal: Negative for abdominal pain, anal bleeding, blood in stool, constipation, diarrhea and vomiting  Genitourinary: Negative for dysuria and hematuria  Musculoskeletal: Negative for arthralgias, back pain, gait problem, joint swelling and myalgias  Skin: Negative for color change and rash  Neurological: Negative for dizziness, seizures, syncope, light-headedness and numbness  Psychiatric/Behavioral: Negative for agitation, behavioral problems and confusion  All other systems reviewed and are negative  Historical Information   Past Medical History:   Diagnosis Date    ADHD (attention deficit hyperactivity disorder)     Asperger's syndrome     Myoclonic jerking      Past Surgical History:   Procedure Laterality Date    BREAST LUMPECTOMY Left     HAND TENDON SURGERY Right 12/14/2016    Procedure: REPAIR EXTENSOR TENDON RIGHT THUMB ;  Surgeon: Regina Morrison MD;  Location: [facility];  Service:      Family History   Adopted: Yes       Occupational History: He has worked as a   He does not have any significant inhalation or occupational exposures from work  He used to work in [place] but now primarily works at home  He smoked years ago but had <5 pack year smoking hx        Meds/Allergies     Current Outpatient Medications:     albuterol (PROVENTIL HFA,VENTOLIN HFA) 90 mcg/act inhaler, INHALE 1-2 PUFFS AS NEEDED EVERY 4 HOURS 30, Disp: , Rfl:     Azelastine-Fluticasone 137-50 MCG/ACT SUSP, 1 spray into each nostril 2 (two) times a day, Disp: 23 g, Rfl: 6    fluticasone-vilanterol (Breo Ellipta) 200-25 MCG/INH inhaler, Inhale 1 puff daily Rinse mouth after use , Disp: 60 blister, Rfl: 6    guanFACINE (TENEX) 2 MG tablet, Take 3 mg by mouth daily at bedtime, Disp: , Rfl:     guanFACINE HCl ER (INTUNIV) 3 MG TB24, , Disp: , Rfl: with 400 or 600 IU is also okay.)  Protect your child from the problems of overweight and obesity by teaching them that healthy choices are important, as are continuing to avoid unhealthy choices like greasy fast food, bagged snacks, sodas, sweetened drinks, juice, candy and sweets. Don’t keep these types of food in your home because your child will find them! If you give your child juice, give them no more than 6 to 8 ounces of 100% fruit juice daily. (Remember that eating fruit is a lot healthier than drinking it!) Also, don't allow snacking in front of the TV set--that is an unhealthy habit that is easier to prevent than change!    Healthy Activity: Set a goal of 60 minutes of physical activity every day--it can be all at once or broken up into shorter segments. Try to choose family activities as much as possible.  Do not overschedule your children. They may be tempted by lots of activities when their friends are participating in them, but they still need plenty of time for schoolwork, family time and some simple unstructured downtime.  Time sitting watching television, playing on the computer or using any form of electronic media is NOT physical activity. Set a time limit for media use each day (and enforce it!).  For suggestions on developing healthy media habits, go to the American Academy of Pediatrics \"HealthyChildren.org\" website and search for \"media use plan\".     Healthy Sleep: Children this age need 10 to 11 hours of sleep each night. Have a regular bedtime routine that does not involve electronic media (including TV) because screen time before bedtime is known to cause sleep problems. Develop a quiet routine that involves reading together or reading in bed for a short time before sleep.    Children this age should not have access to their electronic devices in their bedroom at night. All electronic media use should be supervised.     Safety on the Road: Once your child weighs more than 40 pounds, they  can start riding in a high-backed booster seat. They should ride properly secured in a booster seat in the back seat until they are 4 feet 9 inches tall. High-backed booster seats should be used if there are low seat backs or no head rests in your car; backless boosters can be used if your car has high seat backs and head rests.     Children (and their parents!) should wear properly fitted helmets when biking. Watch your children biking to determine how good their judgement is and set limits about where and when they can be biking based on what you see.     Safety in the Water: This is a good age for swimming lessons if your child is not yet a good swimmer. Even if they are comfortable swimming, they should always be supervised when swimming. They should always wear [employer] approved life jackets when on any sort of boat or watercraft. If you have access to a swimming pool where you live (in an apartment complex, neighborhood or your own yard), be sure it is fenced with a locked, self-closing, self-latching gate which prevents unsupervised access by children. Remember to use sunscreen with an SPF of 15 or higher when outside and reapply after time in the water.  Your child should avoid prolonged time in the sun between 11 AM and 3 PM and wear hats, sunglasses and sun protection clothing.    Personal Safety: A parent’s safety is just as important as a child’s safety. Violence is common in many people’s lives. If you do not feel safe in your home or if a partner has ever hit, kicked, shoved or physically hurt you or your child, it is important for you to get help. Talk to your doctors or a . In [place], resources include [facility] (905-860-1419) and the [facility] (24 hour hotline is 619- 312-4653); the National Domestic Violence Hotline is 8-452-260-REOU (6610).    Review with your child that certain body parts (the parts usually covered by a bathing suit) and    methylphenidate (CONCERTA) 27 MG ER tablet, Take 27 mg by mouth daily, Disp: , Rfl:     methylphenidate (CONCERTA) 27 MG ER tablet, every 24 hours, Disp: , Rfl:   Allergies   Allergen Reactions    Penicillins        Vitals: Blood pressure 140/80, pulse 87, temperature 97 8 °F (36 6 °C), height 5' 10" (1 778 m), weight 81 5 kg (179 lb 9 6 oz), SpO2 97 %  Body mass index is 25 77 kg/m²  Oxygen Therapy  SpO2: 97 %  Oxygen Therapy: None (Room air)      Physical Exam  Physical Exam  Vitals and nursing note reviewed  Constitutional:       Appearance: He is well-developed  HENT:      Head: Normocephalic and atraumatic  Nose: No nasal deformity, septal deviation, signs of injury, laceration, nasal tenderness, mucosal edema or congestion  Right Nostril: No foreign body, epistaxis or occlusion  Left Nostril: No foreign body, epistaxis or occlusion  Right Turbinates: Not enlarged or swollen  Left Turbinates: Not enlarged or swollen  Eyes:      Extraocular Movements: Extraocular movements intact  Conjunctiva/sclera: Conjunctivae normal    Cardiovascular:      Rate and Rhythm: Normal rate and regular rhythm  Heart sounds: No murmur heard  Pulmonary:      Effort: Pulmonary effort is normal  No respiratory distress  Breath sounds: Normal breath sounds  No decreased breath sounds, wheezing, rhonchi or rales  Chest:      Chest wall: No mass  Abdominal:      Palpations: Abdomen is soft  Tenderness: There is no abdominal tenderness  Musculoskeletal:      Cervical back: Normal range of motion and neck supple  Skin:     General: Skin is warm and dry  Neurological:      Mental Status: He is alert  Labs: I have personally reviewed pertinent lab results      ABG: No results found for: PHART, WFZ4GMP, PO2ART, BHV9KTS, I6JGRZHI, BEART, SOURCE,   BNP: No results found for: BNP,   CBC:No results found for: WBC, HGB, HCT, MCV, PLT, ADJUSTEDWBC, EOSPCT, EOSABS, NEUTOPHILPCT, LYMPHOPCT,   CMP: No results found for: SODIUM, K, CL, CO2, ANIONGAP, BUN, CREATININE, GLUCOSE, CALCIUM, AST, ALT, ALKPHOS, PROT, BILITOT, EGFR,   PT/INR: No results found for: PT, INR,   Troponin: No results found for: TROPONINI      Imaging and other studies: I have personally reviewed pertinent reports  and I have personally reviewed pertinent films in PACS      Pulmonary function testing:   None on file    Transthoracic Echo: none on file      EKG, Pathology, and Other Studies: I have personally reviewed pertinent reports  and I have personally reviewed pertinent films in PACS    Keshawn Solomon MD  Pulmonary, Critical Care and Sleep Medicine  [address] [place] [address] [facility]     Portions of the record may have been created with voice recognition software  Occasional wrong word or "sound a like" substitutions may have occurred due to the inherent limitations of voice recognition software  Please read the chart carefully and recognize, using context, where substitutions have occurred  behaviors are private. For safety purposes, make sure your child knows that they should never keep secrets from parents, and they should always report to you if any adult or older child shows any interest in their private parts (or if an older person discussed or shared their own private parts with a child). Tell them they should talk to you if any adult is doing or saying anything that makes them uncomfortable, especially if an adult is asking them to keep a secret.    Remind your child about he the importance of never opening the door to anyone they don’t know. Make sure your child always knows how to reach you and what to do in the case of a fire or other emergency. Teach your child about using “911”.     Guns and Firearms: Firearms in homes can pose a safety risk; if you need to keep a gun, be sure it is stored safely: locked, unloaded, with ammunition stored separately. Even the best-behaved children are curious--so make sure firearms are stored safely in your home and anywhere they visit. They are too young to be taught to safely handle a weapon. Teach them that if they ever see a gun, they should not touch it, they should leave the immediate area and they should tell an adult.    MEDICATION FOR FEVER OR PAIN:   Acetaminophen liquid (e.g., Tylenol or Tempra) may be given every four hours as needed for pain or fever.  Acetaminophen liquid is less concentrated than the infant dropper bottle type.  Be sure to check which product CONCENTRATION you are using.    CHILDREN’S Tylenol/Acetaminophen  (160 MG/5 mL)    Child’s Weight:  Dose:  36 - 47 pounds:    240 mg (7.5 mL (1 1/2 Teaspoons))  48 - 59 pounds:    320 mg (10.0 mL (2 Teaspoons))  60 - 71 pounds:    400 mg (12.5 mL (2 1/2 Teaspoons))  Greater than 72 pounds:   480 mg (15.0 mL (3 Teaspoons))    CHILDREN’S Tylenol/Acetaminophen MELTAWAYS ( 80 MG tablets)    Child’s Weight:  Dose:  36 - 47 pounds:    240 mg (3 meltaway tablets)  48 - 59 pounds:    320 mg (4  meltaway tablets)  60 - 71 pounds:    400 mg (5 meltaway tablets)  Greater than 72 pounds:   480 mg (6 meltaway tablets)     () Tylenol/Acetaminophen MELTAWAYS (160 MG tablets)    Child’s Weight:  Dose:  36 - 47 pounds:    240 mg (1 1/2 meltaway tablets)  48 - 59 pounds:    320 mg (2 meltaway tablets)  60 - 71 pounds:    400 mg (2 1/2 meltaway tablets)  Greater than 72 pounds:   480 mg (3 meltaway tablets)    CHILDREN'S Ibuprofen liquid (e.g., Advil or Motrin) may be given every six hours as needed for pain or fever.    Child’s Weight:  Dose:  36 - 47 pounds:    150 mg (1 1/2 Teaspoons)  48 - 59 pounds:    200 mg (2 Teaspoons)  60 - 71 pounds:    250 mg (2 1/2 Teaspoons)  Greater than 72 pounds:   300 mg (3 Teaspoons)    NEXT VISIT:  IN 1 YEAR      Thank you for entrusting your care to [facility].    Also, check out “Children’s Health” on the [facility] Blog for updates on timely topics regarding children’s health!

## 2022-09-23 ENCOUNTER — HOSPITAL ENCOUNTER (OUTPATIENT)
Dept: NON INVASIVE DIAGNOSTICS | Age: 47
Discharge: HOME | End: 2022-09-23
Payer: COMMERCIAL

## 2022-09-23 VITALS
HEIGHT: 70 IN | WEIGHT: 179 LBS | BODY MASS INDEX: 25.62 KG/M2 | SYSTOLIC BLOOD PRESSURE: 140 MMHG | DIASTOLIC BLOOD PRESSURE: 80 MMHG

## 2022-09-23 DIAGNOSIS — R00.0 TACHYCARDIA: ICD-10-CM

## 2022-09-23 DIAGNOSIS — R06.02 SHORTNESS OF BREATH: ICD-10-CM

## 2022-09-23 LAB
AORTIC ROOT: 3.6 CM
APICAL FOUR CHAMBER EJECTION FRACTION: 62 %
ASCENDING AORTA: 2.9 CM
E WAVE DECELERATION TIME: 148 MS
FRACTIONAL SHORTENING: 32 % (ref 28–44)
INTERVENTRICULAR SEPTUM IN DIASTOLE (PARASTERNAL SHORT AXIS VIEW): 0.7 CM
INTERVENTRICULAR SEPTUM: 0.7 CM (ref 0.6–1.1)
LAAS-AP2: 9.1 CM2
LAAS-AP4: 13.2 CM2
LEFT ATRIUM AREA SYSTOLE SINGLE PLANE A4C: 12.8 CM2
LEFT ATRIUM SIZE: 3.6 CM
LEFT INTERNAL DIMENSION IN SYSTOLE: 3.2 CM (ref 2.1–4)
LEFT VENTRICLE DIASTOLIC VOLUME (MOD BIPLANE): 109 ML
LEFT VENTRICLE SYSTOLIC VOLUME (MOD BIPLANE): 40 ML
LEFT VENTRICULAR INTERNAL DIMENSION IN DIASTOLE: 4.7 CM (ref 3.5–6)
LEFT VENTRICULAR POSTERIOR WALL IN END DIASTOLE: 0.8 CM
LEFT VENTRICULAR STROKE VOLUME: 60 ML
LV EF: 63 %
LVSV (TEICH): 60 ML
MV E'TISSUE VEL-SEP: 12 CM/S
MV PEAK A VEL: 0.59 M/S
MV PEAK E VEL: 81 CM/S
MV STENOSIS PRESSURE HALF TIME: 43 MS
MV VALVE AREA P 1/2 METHOD: 5.12 CM2
RA PRESSURE ESTIMATED: 3 MMHG
RIGHT ATRIUM AREA SYSTOLE A4C: 16.2 CM2
RIGHT VENTRICLE ID DIMENSION: 2.8 CM
RV PSP: 25 MMHG
SL CV LEFT ATRIUM LENGTH A2C: 3.1 CM
SL CV LV EF: 55
SL CV PED ECHO LEFT VENTRICLE DIASTOLIC VOLUME (MOD BIPLANE) 2D: 101 ML
SL CV PED ECHO LEFT VENTRICLE SYSTOLIC VOLUME (MOD BIPLANE) 2D: 42 ML
TR MAX PG: 22 MMHG
TR PEAK VELOCITY: 2.3 M/S
TRICUSPID VALVE PEAK REGURGITATION VELOCITY: 2.34 M/S

## 2022-09-23 PROCEDURE — 93306 TTE W/DOPPLER COMPLETE: CPT

## 2022-09-23 PROCEDURE — 93306 TTE W/DOPPLER COMPLETE: CPT | Performed by: INTERNAL MEDICINE

## 2022-10-31 PROBLEM — R05.9 COUGH: Status: RESOLVED | Noted: 2022-09-01 | Resolved: 2022-10-31

## 2022-12-08 ENCOUNTER — OFFICE VISIT (OUTPATIENT)
Dept: PULMONOLOGY | Facility: HOSPITAL | Age: 47
End: 2022-12-08

## 2022-12-08 VITALS
HEART RATE: 75 BPM | OXYGEN SATURATION: 97 % | WEIGHT: 178 LBS | HEIGHT: 70 IN | DIASTOLIC BLOOD PRESSURE: 100 MMHG | BODY MASS INDEX: 25.48 KG/M2 | SYSTOLIC BLOOD PRESSURE: 138 MMHG

## 2022-12-08 DIAGNOSIS — R04.2 HEMOPTYSIS: Primary | ICD-10-CM

## 2022-12-08 DIAGNOSIS — J45.30 MILD PERSISTENT ASTHMA WITHOUT COMPLICATION: ICD-10-CM

## 2022-12-08 RX ORDER — DOXYCYCLINE HYCLATE 100 MG/1
100 CAPSULE ORAL 2 TIMES DAILY
COMMUNITY
Start: 2022-11-06

## 2022-12-08 RX ORDER — FLUTICASONE FUROATE AND VILANTEROL 100; 25 UG/1; UG/1
1 POWDER RESPIRATORY (INHALATION) EVERY 24 HOURS
COMMUNITY
End: 2022-12-08

## 2022-12-08 NOTE — PROGRESS NOTES
Pulmonary Outpatient Follow Up Note   Paz Che 50 y o  male MRN: [id number]  12/8/2022    Reason for Consultation:    Chief Complaint   Patient presents with   • Follow-up     Feeling much better since using Breo and nose spray  Assessment/Plan:    1  Hemoptysis  Assessment & Plan:  He had occurrences of mild hemoptysis when infected with COVID-19  I reviewed his chest x-ray from [address] [place] [address] Drive today that showed no significant masses, consolidation, cavitary lesions  His symptoms spontaneously resolved  However I will order a chest CT without contrast to delineate any potential parenchymal etiology of hemoptysis  Most likely this was due to viral bronchitis but warrants cross-sectional imaging since he describes it as blood streaks  Orders:  -     CT chest without contrast; Future; Expected date: 12/08/2022    2  Mild persistent asthma without complication  Assessment & Plan:  I believe he has reactive airway disease based on his symptoms and positive response to Breo  He should continue on Breo 200-25 once daily for the foreseeable future  We can reevaluate in 6 months if we can down titrate his dosing to 100-25  I think we can forego pulmonary function testing, IgE count, allergy panel at this time  This can be considered in the future if he has further exacerbations or decrease in his response to [address] Maintenance  Immunization History   Administered Date(s) Administered   • COVID-19 MODERNA VACC 0 25 ML IM BOOSTER 12/29/2021   • COVID-19 PFIZER VACCINE 0 3 ML IM 05/03/2021, 05/26/2021   • Influenza Quadrivalent Preservative Free 3 years and older IM 09/01/2017, 09/13/2018, 10/21/2019, 10/29/2020, 12/04/2021   • Influenza, seasonal, injectable, preservative free 11/26/2015   • Tdap 02/14/2015, 12/09/2016        Return in about 6 months (around 6/8/2023)      History of Present Illness   HPI:  Paz Che is a 50 y o  male w/ a history of ADHD, Asperger's syndrome who is presenting for follow up for cough over the past few months  First seen by me on 22  Patient is currently doing better on Breo 200-25  He notes decreased symptoms of dyspnea on exertion  He is also using azelastine-fluticasone nasal sprays with decreased amounts of postnasal drip and mucus from upper airway  He denies any significant chest pain, palpitations, thrush  He is rinsing his mouth out with Breo use  Patient did have COVID-19 around the end of October, with symptoms of fatigue and then coughing  Coughing was notable for episodes of hemoptysis with blood streaks  This spontaneously remitted without pharmacologic intervention  Otherwise he seems to have recovered fully from [place] and is doing well  Prior history 2022  He was born premature , but unsure of weeks of prematurity or if he required  intensive care  He is unsure of his family history or exact birth details because he is adopted  As a child he apparently was hospitalized for respiratory infections  When playing sports as a child he would have significant coughing  He never used an albuterol inhaler or any other inhalers as a child      He is more dyspneic with exertion in recent years  He has gained weight since the beginning of the pandemic  He has shifted to mainly working from home over this time  His main concern with dyspnea is going up stairs however he is able to play very active VR games without significant noticeable dyspnea  Even when he was working outside of his home he says that he was not particularly active  He does not go to a gym  His work is mainly sedentary at [employer]        He has worked as a   He does not have any significant inhalation or occupational exposures from work  He used to work in [place] but now primarily works at home    He smoked years ago but had <5 pack year smoking hx      During this period of time he has lived in the same house, with no changes to margarito, walls, carpeting  He has a dog at home that sheds and dog danders noticeable on his shirt today  He has exposure to horses but does not manage or process hay  No hot tubs/humidifiers  No mold exposure  No water damage at home  He has a feather pillow but has had that for years     He does have upper airway mucus production without rhinorrhea  He cites that he has more sinus congestion and requires occasional blowing of the nose or sniffing to relieve mucus congestion in the back of his nose  No nosebleeds  No hemoptysis  He uses over-the-counter antihistamines  No nasal sprays     He has never been hospitalized or required ICU level care for respiratory problems  He has not been on oral corticosteroids in the past      He does have itchy eyes and redness of the eyes at times        No significant acid reflux        Review of Systems   Constitutional: Negative for appetite change and fever  HENT: Negative for ear pain, postnasal drip, rhinorrhea, sneezing, sore throat and trouble swallowing  Cardiovascular: Negative for chest pain  Musculoskeletal: Negative for myalgias  Neurological: Negative for headaches         Historical Information   Past Medical History:   Diagnosis Date   • ADHD (attention deficit hyperactivity disorder)    • Asperger's syndrome    • Myoclonic jerking      Past Surgical History:   Procedure Laterality Date   • BREAST LUMPECTOMY Left    • HAND TENDON SURGERY Right 12/14/2016    Procedure: REPAIR EXTENSOR TENDON RIGHT THUMB ;  Surgeon: Doc Andino MD;  Location: [facility] OR;  Service:      Family History   Adopted: Yes       Meds/Allergies     Current Outpatient Medications:   •  albuterol (PROVENTIL HFA,VENTOLIN HFA) 90 mcg/act inhaler, INHALE 1-2 PUFFS AS NEEDED EVERY 4 HOURS 30, Disp: , Rfl:   •  Azelastine-Fluticasone 137-50 MCG/ACT SUSP, 1 spray into each nostril 2 (two) times a day, Disp: 23 g, Rfl: 6  •  guanFACINE HCl ER (INTUNIV) 3 MG TB24, , Disp: , Rfl: •  methylphenidate (CONCERTA) 27 MG ER tablet, every 24 hours, Disp: , Rfl:   •  doxycycline hyclate (VIBRAMYCIN) 100 mg capsule, Take 100 mg by mouth 2 (two) times a day (Patient not taking: Reported on 12/8/2022), Disp: , Rfl:   •  fluticasone-vilanterol (Breo Ellipta) 200-25 MCG/INH inhaler, Inhale 1 puff daily Rinse mouth after use , Disp: 60 blister, Rfl: 6  •  guanFACINE (TENEX) 2 MG tablet, Take 3 mg by mouth daily at bedtime (Patient not taking: Reported on 12/8/2022), Disp: , Rfl:   •  methylphenidate (CONCERTA) 27 MG ER tablet, Take 27 mg by mouth daily (Patient not taking: Reported on 12/8/2022), Disp: , Rfl:   Allergies   Allergen Reactions   • Penicillin V Other (See Comments)   • Penicillins        Vitals: Blood pressure 138/100, pulse 75, height 5' 10" (1 778 m), weight 80 7 kg (178 lb), SpO2 97 %  Body mass index is 25 54 kg/m²  Oxygen Therapy  SpO2: 97 %  Oxygen Therapy: None (Room air)    Physical Exam  Constitutional:       General: He is not in acute distress  Appearance: Normal appearance  He is normal weight  He is not ill-appearing, toxic-appearing or diaphoretic  HENT:      Head: Normocephalic and atraumatic  Right Ear: External ear normal       Left Ear: External ear normal       Nose: Nose normal       Mouth/Throat:      Mouth: Mucous membranes are moist       Pharynx: Oropharynx is clear  No oropharyngeal exudate  Eyes:      General: No scleral icterus  Extraocular Movements: Extraocular movements intact  Conjunctiva/sclera: Conjunctivae normal    Cardiovascular:      Rate and Rhythm: Normal rate and regular rhythm  Heart sounds: Normal heart sounds  Pulmonary:      Effort: Pulmonary effort is normal  No respiratory distress  Breath sounds: Normal breath sounds  No stridor  No wheezing, rhonchi or rales  Abdominal:      General: Abdomen is flat  There is no distension  Palpations: Abdomen is soft  Tenderness: There is no abdominal tenderness  There is no guarding  Musculoskeletal:         General: No deformity  Normal range of motion  Cervical back: Normal range of motion and neck supple  No rigidity  Lymphadenopathy:      Cervical: No cervical adenopathy  Skin:     General: Skin is warm and dry  Coloration: Skin is not jaundiced or pale  Findings: No lesion or rash  Neurological:      General: No focal deficit present  Mental Status: He is alert and oriented to person, place, and time  Mental status is at baseline  Motor: No weakness  Psychiatric:         Mood and Affect: Mood normal          Behavior: Behavior normal          Thought Content: Thought content normal          Labs: I have personally reviewed pertinent lab results  ABG: No results found for: PHART, FXA8HTC, PO2ART, GIB4AZC, S9NQOEQK, BEART, SOURCE,   BNP: No results found for: BNP,   CBC:No results found for: WBC, HGB, HCT, MCV, PLT, ADJUSTEDWBC, EOSPCT, EOSABS, NEUTOPHILPCT, LYMPHOPCT,   CMP: No results found for: SODIUM, K, CL, CO2, ANIONGAP, BUN, CREATININE, GLUCOSE, CALCIUM, AST, ALT, ALKPHOS, PROT, BILITOT, EGFR,   PT/INR: No results found for: PT, INR,   Troponin: No results found for: TROPONINI      Imaging and other studies: I have personally reviewed pertinent reports  and I have personally reviewed pertinent films in PACS  I reviewed his chest x-ray from 11/8/2022, when he was still recovering from COVID-19 and there are mild, subtle lower lobe reticulations and infiltrates notable on the PA view  Pulmonary function testing:   None on file    Transthoracic Echo:  9/23/2022  •  Left Ventricle: Left ventricular cavity size is normal  Wall thickness is normal  The left ventricular ejection fraction is 55-60%  Systolic function is normal  Wall motion is normal  Diastolic function is normal   •  Tricuspid Valve: The right ventricular systolic pressure is normal  The estimated right ventricular systolic pressure is 83 18 mmHg    • Pulmonary Artery: There is no evidence of systolic notching of the RVOT Doppler waveform        Irene Adames MD  Pulmonary, Critical Care and Sleep Medicine  [facility]       Answers for HPI/ROS submitted by the patient on 12/7/2022  Chronicity: chronic  When did you first notice your symptoms?: more than 1 year ago  How often do your symptoms occur?: daily  Since you first noticed this problem, how has it changed?: gradually worsening  Have you had a change in appetite?: No  Do you have chest pain?: No  Do you have shortness of breath that occurs with effort or exertion?: No  Do you have ear congestion?: No  Do you have ear pain?: No  Do you have a fever?: No  Do you have headaches?: No  Do you have heartburn?: No  Do you have fatigue?: No  Do you have muscle pain?: No  Do you have nasal congestion?: No  Do you have shortness of breath when lying flat?: No  Do you have shortness of breath when you wake up?: No  Do you have post-nasal drip?: No  Do you have a runny nose?: No  Do you have sneezing?: No  Do you have a sore throat?: No  Do you have sweats?: No  Do you have trouble swallowing?: No  Have you experienced weight loss?: No  Which of the following makes your symptoms worse?: exposure to fumes, strenuous activity, URI  Which of the following makes your symptoms better?: steroid inhaler  Risk factors for lung disease: animal exposure

## 2022-12-08 NOTE — ASSESSMENT & PLAN NOTE
He had occurrences of mild hemoptysis when infected with COVID-19  I reviewed his chest x-ray from [address] today that showed no significant masses, consolidation, cavitary lesions  His symptoms spontaneously resolved  However I will order a chest CT without contrast to delineate any potential parenchymal etiology of hemoptysis  Most likely this was due to viral bronchitis but warrants cross-sectional imaging since he describes it as blood streaks

## 2022-12-08 NOTE — ASSESSMENT & PLAN NOTE
I believe he has reactive airway disease based on his symptoms and positive response to [facility]  He should continue on Breo 200-25 once daily for the foreseeable future  We can reevaluate in 6 months if we can down titrate his dosing to 100-25  I think we can forego pulmonary function testing, IgE count, allergy panel at this time    This can be considered in the future if he has further exacerbations or decrease in his response to [facility]

## 2022-12-16 ENCOUNTER — HOSPITAL ENCOUNTER (OUTPATIENT)
Dept: CT IMAGING | Facility: HOSPITAL | Age: 47
Discharge: HOME | End: 2022-12-16
Attending: INTERNAL MEDICINE

## 2022-12-16 DIAGNOSIS — R04.2 HEMOPTYSIS: ICD-10-CM

## 2022-12-23 DIAGNOSIS — J43.8 OTHER EMPHYSEMA (HCC): Primary | ICD-10-CM

## 2023-01-23 DIAGNOSIS — R06.00 DYSPNEA, UNSPECIFIED TYPE: ICD-10-CM

## 2023-01-25 ENCOUNTER — TELEPHONE (OUTPATIENT)
Dept: PULMONOLOGY | Facility: CLINIC | Age: 48
End: 2023-01-25

## 2023-01-25 RX ORDER — AZELASTINE HYDROCHLORIDE, FLUTICASONE PROPIONATE 137; 50 UG/1; UG/1
1 SPRAY, METERED NASAL 2 TIMES DAILY
Qty: 23 G | Refills: 0 | Status: SHIPPED | OUTPATIENT
Start: 2023-01-25 | End: 2023-01-31

## 2023-01-25 NOTE — PROGRESS NOTES
Breo no longer covered by insurance    I called [facility] and called in the generic formulation 200-25mcg 1 puff once a day, 6 refills

## 2023-01-25 NOTE — TELEPHONE ENCOUNTER
Patient is calling, he was denied for name brand Breo  His insurance said he could be approved if we submitted a medical necessity for it  If not he is covered for the generic version of Breo  He is asking if you can either please send in the generic today or let him know if he needs to pay the $300 for the [facility] he isn't covered for just so he has something to use to breath  If you want to do a prior auth he provided the number to contact below   Please advise      Medical Necessity/Insurance  #[phone]

## 2023-01-31 DIAGNOSIS — J45.30 MILD PERSISTENT ASTHMA WITHOUT COMPLICATION: Primary | ICD-10-CM

## 2023-01-31 RX ORDER — FLUTICASONE PROPIONATE 220 UG/1
2 AEROSOL, METERED RESPIRATORY (INHALATION) 2 TIMES DAILY
Qty: 36 G | Refills: 6 | Status: SHIPPED | OUTPATIENT
Start: 2023-01-31 | End: 2023-01-31

## 2023-01-31 RX ORDER — BUDESONIDE AND FORMOTEROL FUMARATE DIHYDRATE 160; 4.5 UG/1; UG/1
2 AEROSOL RESPIRATORY (INHALATION) 2 TIMES DAILY
Qty: 30.6 G | Refills: 3 | Status: SHIPPED | OUTPATIENT
Start: 2023-01-31 | End: 2023-01-31

## 2023-01-31 NOTE — TELEPHONE ENCOUNTER
Patient called frustrated because he has not received the replacement inhaler for generic of Breo at his pharmacy  It seems there was a nose spray sent in Im not sure if that's what you had wanted to prescribe him  Please advise

## 2023-02-01 DIAGNOSIS — J20.9 ACUTE BRONCHITIS, UNSPECIFIED ORGANISM: Primary | ICD-10-CM

## 2023-02-01 RX ORDER — PREDNISONE 20 MG/1
20 TABLET ORAL DAILY
Qty: 5 TABLET | Refills: 0 | Status: SHIPPED | OUTPATIENT
Start: 2023-02-03 | End: 2023-02-08

## 2023-02-01 NOTE — PROGRESS NOTES
I called the patient and he is having upper respiratory tract symptoms. Unfortunately his [facility] was not covered by his insurance. I called his pharmacy and prescribed Advair discus 250-50 mcg per actuation 1 puff twice daily which the pharmacist told me will be approximately $10 per month for his insurance. We will work on a prior authorization for [facility]. The pharmacy currently does not carry the generic version of Breo made by [employer].      The pharmacy had inadvertently refilled fluticasone-azelastine nasal spray when I called on 1/26/2023 to prescribe his fluticasone/vilanterol inhaler. The patient is finishing a Medrol Dosepak but still has significant cough. I prescribed 20 mg of prednisone daily for 5 days to start on February 3, 2023. He is completing a antibiotic course prescribed by his PCP.

## 2023-02-06 ENCOUNTER — TELEPHONE (OUTPATIENT)
Dept: PULMONOLOGY | Facility: CLINIC | Age: 48
End: 2023-02-06

## 2023-02-06 ENCOUNTER — HOSPITAL ENCOUNTER (OUTPATIENT)
Dept: RADIOLOGY | Facility: HOSPITAL | Age: 48
Discharge: HOME | End: 2023-02-06

## 2023-02-06 DIAGNOSIS — R04.2 HEMOPTYSIS: ICD-10-CM

## 2023-02-06 DIAGNOSIS — R04.2 HEMOPTYSIS: Primary | ICD-10-CM

## 2023-02-06 RX ORDER — ALBUTEROL SULFATE 90 UG/1
2 AEROSOL, METERED RESPIRATORY (INHALATION) EVERY 6 HOURS PRN
Qty: 18 G | Refills: 3 | Status: SHIPPED | OUTPATIENT
Start: 2023-02-06 | End: 2023-03-14 | Stop reason: SDUPTHER

## 2023-02-06 RX ORDER — BENZONATATE 100 MG/1
100 CAPSULE ORAL 3 TIMES DAILY PRN
Qty: 90 CAPSULE | Refills: 0 | Status: SHIPPED | OUTPATIENT
Start: 2023-02-06 | End: 2023-04-20

## 2023-02-06 NOTE — TELEPHONE ENCOUNTER
Pt was sick like 2 weeks ago  Towards the end he had a high fever of 103 5  Pt does not have any more fevers  Pt stated this morning he was and coughed up bright red liquid blood  Pt denies pasha further symptoms    Please advise

## 2023-02-06 NOTE — TELEPHONE ENCOUNTER
Dr Emi Quintero, I called and spoke with the patient  He said it was 1 episode of hemoptysis this AM and he is feeling fine otherwise  He says it is the same exact thing that happened to him the last time  Currently without fever, shortness of breath, or chest pain  For the remainder of today when he has coughed up mucus,it has been clear  He was sick a few weeks ago with high fevers but felt better after he received azithromycin and prednisone  He is not smoking or vaping  I advised the patient to go get a chest x-ray and he will get that done [facility] at [address] [place] [address]  I instructed him to continue to monitor the amount of hemoptysis he is having and advised him to go to the ER if he starts to have massive hemoptysis or worsening symptoms such as shortness of breath and chest pain  I sent Tessalon Perles to the pharmacy for him to help with the cough as he is still coughing frequently  I reviewed his CT from December which was unremarkable  I suspect this is 2/2 to airway irritation from frequent cough but wanted to check with you if there are any other diagnostic tests you recommend or if you want patient to come in for a visit?

## 2023-02-21 ENCOUNTER — TELEPHONE (OUTPATIENT)
Dept: PULMONOLOGY | Facility: CLINIC | Age: 48
End: 2023-02-21

## 2023-02-21 NOTE — TELEPHONE ENCOUNTER
Patient calling to answer Mary's message from when she asked how was he feeling after the CXR  Patient was not sure how to respond but he says that yesterday was the first day of not having mucus  He says that the advair helps but he often feels like it is hard to breathe or hard catch his breath  He also states that he is still having trouble getting a refill of the Breo inhaler  Please advise

## 2023-02-22 DIAGNOSIS — J45.30 MILD PERSISTENT ASTHMA WITHOUT COMPLICATION: Primary | ICD-10-CM

## 2023-02-22 RX ORDER — FLUTICASONE FUROATE AND VILANTEROL 200; 25 UG/1; UG/1
1 POWDER RESPIRATORY (INHALATION) DAILY
Qty: 60 BLISTER | Refills: 3 | Status: SHIPPED | OUTPATIENT
Start: 2023-02-22 | End: 2023-04-19 | Stop reason: ALTCHOICE

## 2023-02-22 RX ORDER — PREDNISONE 10 MG/1
TABLET ORAL
Qty: 30 TABLET | Refills: 0 | Status: SHIPPED | OUTPATIENT
Start: 2023-02-22 | End: 2023-03-06

## 2023-02-22 NOTE — TELEPHONE ENCOUNTER
I called and spoke with the patient  He has not had any recurrence of hemoptysis  He is having shortness of breath and chest tightness which sounds like exacerbation of his underlying asthma  Responded to prednisone burst previously  I think that he needs a longer taper  I sent prednisone to the pharmacy for him  He is using Advair in place of Breo for now  He will continue Advair unless he hears back about approval for Breo  Andino Button, please call him back to schedule a follow-up with me or Dr Lisseth More  Thank you

## 2023-03-14 ENCOUNTER — OFFICE VISIT (OUTPATIENT)
Dept: PULMONOLOGY | Facility: HOSPITAL | Age: 48
End: 2023-03-14

## 2023-03-14 VITALS
WEIGHT: 176.3 LBS | OXYGEN SATURATION: 96 % | TEMPERATURE: 100.2 F | HEART RATE: 100 BPM | SYSTOLIC BLOOD PRESSURE: 124 MMHG | RESPIRATION RATE: 18 BRPM | BODY MASS INDEX: 25.24 KG/M2 | DIASTOLIC BLOOD PRESSURE: 90 MMHG | HEIGHT: 70 IN

## 2023-03-14 DIAGNOSIS — R06.02 SHORTNESS OF BREATH: Primary | ICD-10-CM

## 2023-03-14 DIAGNOSIS — R04.2 HEMOPTYSIS: ICD-10-CM

## 2023-03-14 DIAGNOSIS — J45.31 MILD PERSISTENT ASTHMA WITH ACUTE EXACERBATION: ICD-10-CM

## 2023-03-14 DIAGNOSIS — J30.9 ALLERGIC RHINITIS, UNSPECIFIED SEASONALITY, UNSPECIFIED TRIGGER: ICD-10-CM

## 2023-03-14 RX ORDER — FLUTICASONE PROPIONATE AND SALMETEROL 500; 50 UG/1; UG/1
1 POWDER RESPIRATORY (INHALATION) 2 TIMES DAILY
Qty: 60 BLISTER | Refills: 6 | Status: SHIPPED | OUTPATIENT
Start: 2023-03-14 | End: 2023-06-12

## 2023-03-14 RX ORDER — PREDNISONE 10 MG/1
TABLET ORAL
Qty: 30 TABLET | Refills: 0 | Status: SHIPPED | OUTPATIENT
Start: 2023-03-14 | End: 2023-03-26

## 2023-03-14 RX ORDER — METHYLPREDNISOLONE 4 MG/1
TABLET ORAL
COMMUNITY
Start: 2023-01-29

## 2023-03-14 RX ORDER — FLUTICASONE PROPIONATE AND SALMETEROL 100; 50 UG/1; UG/1
1 POWDER RESPIRATORY (INHALATION) EVERY 12 HOURS
COMMUNITY
End: 2023-03-14

## 2023-03-14 RX ORDER — IPRATROPIUM BROMIDE AND ALBUTEROL SULFATE 2.5; .5 MG/3ML; MG/3ML
3 SOLUTION RESPIRATORY (INHALATION) 4 TIMES DAILY
Qty: 1080 ML | Refills: 3 | Status: SHIPPED | OUTPATIENT
Start: 2023-03-14

## 2023-03-14 RX ORDER — MONTELUKAST SODIUM 10 MG/1
TABLET ORAL
COMMUNITY
Start: 2023-03-10

## 2023-03-14 RX ORDER — AZITHROMYCIN 250 MG/1
TABLET, FILM COATED ORAL
COMMUNITY
Start: 2023-01-29 | End: 2023-03-21

## 2023-03-14 RX ORDER — ALBUTEROL SULFATE 90 UG/1
2 AEROSOL, METERED RESPIRATORY (INHALATION) EVERY 6 HOURS PRN
Qty: 18 G | Refills: 3 | Status: SHIPPED | OUTPATIENT
Start: 2023-03-14

## 2023-03-14 RX ORDER — FLUTICASONE PROPIONATE AND SALMETEROL 50; 250 UG/1; UG/1
POWDER RESPIRATORY (INHALATION)
COMMUNITY
Start: 2023-02-28 | End: 2023-03-14

## 2023-03-14 NOTE — PROGRESS NOTES
Assessment:    1  Shortness of breath  ipratropium-albuterol (DUO-NEB) 0 5-2 5 mg/3 mL nebulizer solution    XR chest pa & lateral    Sputum culture and Gram stain    predniSONE 10 mg tablet      2  Mild persistent asthma with acute exacerbation  Fluticasone-Salmeterol (Advair Diskus) 500-50 mcg/dose inhaler    ipratropium-albuterol (DUO-NEB) 0 5-2 5 mg/3 mL nebulizer solution    Nebulizer    albuterol (PROVENTIL HFA,VENTOLIN HFA) 90 mcg/act inhaler    Sputum culture and Gram stain    predniSONE 10 mg tablet      3  Allergic rhinitis, unspecified seasonality, unspecified trigger        4  Hemoptysis  albuterol (PROVENTIL HFA,VENTOLIN HFA) 90 mcg/act inhaler    recurrent, not happening at this time          Plan:   · Patient presenting for sick visit, appears to be suffering from asthma exacerbation and bronchitis, likely viral in origin (after exposure to grandson)  · We have agreed to hold off on antibiotics for now  · Prednisone taper sent to the pharmacy  · Obtain chest x-ray as soon as possible, sputum culture if able  · He would benefit from having a nebulizer machine  1 was provided today to be used with DuoNeb up to 4 times daily as needed  · Unfortunately, he found greatest benefit from [place] but this is not covered by his insurance  Will increase the dose of his Advair diskus to 500-50  · His PCP prescribed montelukast & he wants to know if he can take it  Advised that is fine  · Not currently having any hemoptysis thankfully  · F/u in 4 weeks  Hopefully more stable at that point so we can obtain PFTs  Given his frequent flare-ups lately should likely follow-up with allergy panel w IgE and CBC with absolute eosinophils  Subjective:     Patient ID: Oksana Duarte is a 50 y o  male  Chief Complaint:    Marge Fay is a 61-year-old male with past medical history including ADHD and Asperger syndrome presenting for sick visit    This past Saturday, he started feeling unwell on Sunday his respiratory symptoms started  He had just recently been exposed to his grandson who is also ill  He has not noted any fevers at home however his temperature is low-grade today at 100 2  His cough is frequent but only productive for scant clear sputum  No hemoptysis or purulence  He is using his albuterol inhaler a bit more than he should be as it does provide him relief  Prednisone also seems to make him feel much better        Prior history 2022 from Dr Reynolds Portal:  He was born premature , but unsure of weeks of prematurity or if he required  intensive care   He is unsure of his family history or exact birth details because he is adopted  As a child he apparently was hospitalized for respiratory infections   When playing sports as a child he would have significant coughing   He never used an albuterol inhaler or any other inhalers as a child      He is more dyspneic with exertion in recent years  [facility] has gained weight since the beginning of the pandemic  [facility] has shifted to mainly working from home over this time  Health system [facility] main concern with dyspnea is going up stairs however he is able to play very active VR games without significant noticeable dyspnea   Even when he was working outside of his home he says that he was not particularly active   He does not go to a gym   His work is mainly sedentary at a desk        He has worked as a    He does not have any significant inhalation or occupational exposures from [facility] [employer] used to work in [place] but now primarily works at home  [facility] smoked years ago but had <5 pack year smoking hx      During this period of time he has lived in the same house, with no changes to margarito, walls, carpeting  [facility] has a dog at home that sheds and dog danders noticeable on his shirt today   He has exposure to horses but does not manage or process hay   No hot tubs/humidifiers   No mold exposure   No water damage at home   He has a feather pillow but has had that for years     He does have upper airway mucus production without rhinorrhea   He cites that he has more sinus congestion and requires occasional blowing of the nose or sniffing to relieve mucus congestion in the back of his nose   No nosebleeds   No hemoptysis   He uses over-the-counter antihistamines   No nasal sprays     He has never been hospitalized or required ICU level care for respiratory problems   He has not been on oral corticosteroids in the past      He does have itchy eyes and redness of the eyes at times        No significant acid reflux      The following portions of the patient's history were reviewed in this encounter and updated as appropriate: Past medical, social, surgical, family, allergies    Review of Systems   Constitutional: Positive for fatigue  HENT: Positive for congestion  Respiratory: Positive for cough, chest tightness and shortness of breath  All other systems reviewed and are negative  Objective:    Physical Exam  Vitals reviewed  Constitutional:       General: He is not in acute distress  Appearance: He is not toxic-appearing  HENT:      Head: Normocephalic and atraumatic  Eyes:      General: No scleral icterus  Cardiovascular:      Rate and Rhythm: Normal rate and regular rhythm  Pulmonary:      Breath sounds: Wheezing and rhonchi present  Comments: Frequent cough  Musculoskeletal:         General: No signs of injury  Skin:     General: Skin is warm and dry  Neurological:      General: No focal deficit present  Mental Status: He is alert  Mental status is at baseline  Psychiatric:         Mood and Affect: Mood normal          Behavior: Behavior normal          Lab Review:   No visits with results within 2 Month(s) from this visit     Latest known visit with results is:   Hospital Outpatient Visit on 09/23/2022   Component Date Value   • LA size 09/23/2022 3 6    • Triscuspid Valve Regurgi* 09/23/2022 22 0    • Tricuspid valve peak reg* 09/23/2022 2 34    • LVPWd 09/23/2022 0 80    • Left Atrium Area-systoli* 09/23/2022 9 1    • Left Atrium Area-systoli* 09/23/2022 13 2    • MV E' Tissue Velocity Se* 09/23/2022 12    • TR Peak Pradeep 09/23/2022 2 3    • IVSd 09/23/2022 3 65    • LV DIASTOLIC VOLUME (MOD* 97/30/5347 101    • LEFT VENTRICLE SYSTOLIC * 31/55/3907 42    • Left ventricular stroke * 09/23/2022 60 00    • EF 09/23/2022 63    • A4C EF 09/23/2022 62    • LA length (A2C) 09/23/2022 3 10    • LVIDd 09/23/2022 4 70    • IVS 09/23/2022 0 7    • LVIDS 09/23/2022 3 20    • FS 09/23/2022 32    • Asc Ao 09/23/2022 2 9    • Ao root 09/23/2022 3 60    • RVID d 09/23/2022 2 8    • MV valve area p 1/2 meth* 09/23/2022 5 12    • E wave deceleration time 09/23/2022 148    • MV Peak E Pradeep 09/23/2022 81    • MV Peak A Pradeep 09/23/2022 7 24    • LV Systolic Volume (BP) 79/47/8138 40    • LV Diastolic Volume (BP) 85/66/8236 109    • CHERRI A4C 09/23/2022 12 8    • RAA A4C 09/23/2022 16 2    • MV stenosis pressure 1/2* 09/23/2022 43    • LVSV, 2D 09/23/2022 60    • LV EF 09/23/2022 55    • Est  RA pres 09/23/2022 3 0    • Right Ventricular Peak S* 09/23/2022 25 00

## 2023-03-16 ENCOUNTER — TELEPHONE (OUTPATIENT)
Dept: PULMONOLOGY | Facility: CLINIC | Age: 48
End: 2023-03-16

## 2023-03-16 NOTE — TELEPHONE ENCOUNTER
Patient is calling, he was just in the office on 3/14 with a productive cough  At the time his sputum wasn't a specific color and wasn't prescribed any antibiotics  He is now bringing up yellow phlegm and is wonder if we want to prescribe an antibiotic   Please advise

## 2023-03-17 ENCOUNTER — HOSPITAL ENCOUNTER (OUTPATIENT)
Dept: RADIOLOGY | Facility: HOSPITAL | Age: 48
End: 2023-03-17

## 2023-03-17 DIAGNOSIS — R06.02 SHORTNESS OF BREATH: ICD-10-CM

## 2023-03-20 DIAGNOSIS — J40 BRONCHITIS: Primary | ICD-10-CM

## 2023-03-20 RX ORDER — LEVOFLOXACIN 500 MG/1
500 TABLET, FILM COATED ORAL EVERY 24 HOURS
Qty: 7 TABLET | Refills: 0 | Status: SHIPPED | OUTPATIENT
Start: 2023-03-20 | End: 2023-03-21

## 2023-03-20 RX ORDER — GUAIFENESIN/DEXTROMETHORPHAN 100-10MG/5
5 SYRUP ORAL 4 TIMES DAILY PRN
Qty: 237 ML | Refills: 1 | Status: SHIPPED | OUTPATIENT
Start: 2023-03-20

## 2023-03-21 ENCOUNTER — TELEPHONE (OUTPATIENT)
Dept: PULMONOLOGY | Facility: HOSPITAL | Age: 48
End: 2023-03-21

## 2023-03-21 DIAGNOSIS — J45.909 EXTRINSIC ASTHMA WITHOUT COMPLICATION, UNSPECIFIED ASTHMA SEVERITY, UNSPECIFIED WHETHER PERSISTENT: Primary | ICD-10-CM

## 2023-03-21 NOTE — TELEPHONE ENCOUNTER
Called patient about his cough  He has a thick mucus  Taking Tessalon perles  Still on prednisone  He is taking a antibiotics - cefuroxime  - 2 more days of cefuroxime  He is still using Advair BID  Singulair    Prednisone is really helping him right now    He has a nebulizer right now - he is using albuterol-ipratropium        He has an appt with me on 4/20    I ordered a CBC and allergy panel to be done in 2 weeks after he finishes his steroids

## 2023-04-21 PROBLEM — J45.40 MODERATE PERSISTENT ASTHMA: Status: ACTIVE | Noted: 2022-12-08

## 2023-04-21 PROBLEM — J43.8 PARASEPTAL EMPHYSEMA (HCC): Status: ACTIVE | Noted: 2023-04-21

## 2023-04-21 PROBLEM — B99.9 RECURRENT INFECTIONS: Status: ACTIVE | Noted: 2023-04-21

## 2023-06-12 NOTE — PROGRESS NOTES
"  [address]    NAME: Candice Rodriguez  AGE: 50 y o  SEX: male  : 1976     DATE: 2023     Assessment and Plan:     Problem List Items Addressed This Visit        Cardiovascular and Mediastinum    Essential hypertension     Blood pressure:   BP Readings from Last 3 Encounters:   23 124/83   23 125/82   23 129/92       Continue current antihypertensive  Educated on the following lifestyle modifications to lower BP and decrease cardiovascular risk factors  limit alcohol intake, reduce salt in diet, maintain a healthy weight, engage in 30 minutes of cardiovascular exercise daily, and not smoke  Other    Symptomatic HIV infection ([facility] [address] ) - Primary     No results found for: \"LW2TYBR\"  CD4 ABS   Date/Time Value Ref Range Status   2023 01:15  359 - 1519 /uL Final     HIV-1 RNA by PCR, Qn   Date/Time Value Ref Range Status   2023 01:15 PM 30 copies/mL Final     Comment:     The reportable range for this assay is 20 to 10,000,000  copies HIV-1 RNA/mL  HIV-1 RNA Viral Load Log   Date/Time Value Ref Range Status   2023 01:15 PM 1 477 [id number]/mL Final         ART: Page Aguirre        Denies side effects  Stressed the importance of adherence  Continue follow up with ID clinic  Reviewed most recent labs, including Cd4 and viral load  Discussed the risks and benefits of treatment options, instructions for management, importance of treatment adherence, and reduction of risk factor  Educated on possible  medication side effects  Counseled on routes of HIV transmission, including the risk of  infection  Emphasized that viral suppression is the best method to prevent HIV transmission  At this time pt denies the need for HIV testing of anyone in their life  Total encounter time was 45 minutes  Greater then 20 minutes were spent on counseling and patient education   Pt " Daily Note     Today's date: 2019  Patient name: Stephna Crabtree  : 1975  MRN: [id number]  Referring provider: Teresa Diallo DO  Dx:   Encounter Diagnosis     ICD-10-CM    1  Adhesive capsulitis of left shoulder M75 02        Start Time: 1051  Stop Time: 1137  Total time in clinic (min): 46 minutes    Subjective:  still has pain /discomfort in the shoulder but it is gradually decreasing; most difficulty is with reaching behind back      Objective: See treatment diary below      Assessment: Reaching behind back is most limited by deficit in IR ROM  Added new exercises as noted below  Tolerated treatment well  Patient would benefit from continued PT      Plan: Continue per plan of care                 Precautions: NONE      Daily Treatment Diary     Manual  19          PROM/mobs L sh nv JR JR          AAROM prone IR w/ ext   JR                                                     Exercise Diary  19          Pendulums 3# 2' 3# 2' hep          Shaye Carmelina :horiz add 10x 15x hep          Scap retraction depression  10x10" hep          Cane: sh flex 10x 15x hep          Cane: sh ER 90/90 supine 10x hep hep          Cane: sh scaption 10x 15x hep          Cane: ext 10x  hep          Scap punches  15x hep          Towel stretch             Cane: ER/IR @ side  15x hep          T-spine rot w/ reach leantable   10xea          Standing scap/ flexion   nv          Overhead pulley  3' 3'          butterflies  10x hep          T-spine rot w/ reach in SL  10x 10x          Prone ext/IR hand on back   10x          B sh flex TB assist   10x                                    UBE   3'/3'              Modalities              CP prn "voices understanding and agreement with treatment plan  BMI 28 0-28 9,adult     Body mass index is 28 51 kg/m²  Wt Readings from Last 3 Encounters:   06/12/23 82 6 kg (182 lb)   05/02/23 84 5 kg (186 lb 3 2 oz)   03/06/23 84 2 kg (185 lb 9 6 oz)     Height: 5' 7\" (170 2 cm)           Lab Results   Component Value Date    HGBA1C 5 1 03/27/2023     No results found for: \"GLUCOSE\";    Educated on the health risks of obesity  Advised to lose weight  Encouraged eating a healthy diet and daily cardiac exercise  Recommended increasing fruits and vegetables and limiting processed foods  Refer to dietitian for additional education  Other Visit Diagnoses     Annual physical exam              Immunizations and preventive care screenings were discussed with patient today  Appropriate education was printed on patient's after visit summary  Discussed risks and benefits of prostate cancer screening  We discussed the controversial history of PSA screening for prostate cancer in the [place] as well as the risk of over detection and over treatment of prostate cancer by way of PSA screening  The patient understands that PSA blood testing is an imperfect way to screen for prostate cancer and that elevated PSA levels in the blood may also be caused by infection, inflammation, prostatic trauma or manipulation, urological procedures, or by benign prostatic enlargement  The role of the digital rectal examination in prostate cancer screening was also discussed and I discussed with him that there is large interobserver variability in the findings of digital rectal examination  Counseling:  Dental Health: discussed importance of regular tooth brushing, flossing, and dental visits  · Exercise: the importance of regular exercise/physical activity was discussed  Recommend exercise 3-5 times per week for at least 30 minutes  BMI Counseling: Body mass index is 28 51 kg/m²   The BMI is above normal  " Nutrition recommendations include decreasing portion sizes and consuming healthier snacks  Rationale for BMI follow-up plan is due to patient being overweight or obese  No follow-ups on file  Chief Complaint:     Chief Complaint   Patient presents with   • Follow-up     Pt offers no c/o at this time  History of Present Illness:     Adult Annual Physical   Patient here for a comprehensive physical exam  The patient reports no problems  Diet and Physical Activity  · Diet/Nutrition: well balanced diet and consuming 3-5 servings of fruits/vegetables daily  · Exercise: 3-4 times a week on average  Depression Screening  PHQ-2/9 Depression Screening         General Health  · Sleep: sleeps well  · Hearing: normal - none   · Vision: wears glasses and contacts  · Dental: regular dental visits and brushes teeth twice daily   Health  · Symptoms include: none     Review of Systems:     Review of Systems   Constitutional: Negative for chills and fever  HENT: Negative for ear pain and sore throat  Eyes: Negative for pain and visual disturbance  Respiratory: Negative for cough and shortness of breath  Cardiovascular: Negative for chest pain and palpitations  Gastrointestinal: Negative for abdominal pain and vomiting  Genitourinary: Negative for dysuria and hematuria  Musculoskeletal: Negative for arthralgias and back pain  Skin: Negative for color change and rash  Neurological: Negative for seizures and syncope  All other systems reviewed and are negative  Past Medical History:     Past Medical History:   Diagnosis Date   • Abscess of left groin    • Dental decay    • Depression    • Elevated BP without diagnosis of hypertension    • HIV disease ([facility] ) 11/25/1998    mode of transmission: MSM   • Hx of herpes zoster    • Hypogonadism in male    • Tear of right biceps muscle 2017      Past Surgical History:     No past surgical history on file     Family History: Family History   Problem Relation Age of Onset   • Hypertension Mother    • Hypertension Father    • Heart attack Father       Social History:     Social History     Socioeconomic History   • Marital status: /Civil Union     Spouse name: None   • Number of children: None   • Years of education: None   • Highest education level: None   Occupational History   • None   Tobacco Use   • Smoking status: Never   • Smokeless tobacco: Never   Vaping Use   • Vaping Use: Never used   Substance and Sexual Activity   • Alcohol use:  Yes     Alcohol/week: 1 0 standard drink of alcohol     Types: 1 Glasses of wine per week     Comment: 1 glass wine/day   • Drug use: Never   • Sexual activity: Not Currently     Partners: Male     Birth control/protection: Condom Male   Other Topics Concern   • None   Social History Narrative   • None     Social Determinants of Health     Financial Resource Strain: Not on file   Food Insecurity: Food Insecurity Present (3/7/2022)    Hunger Vital Sign    • Worried About Running Out of Food in the Last Year: Sometimes true    • Ran Out of Food in the Last Year: Sometimes true   Transportation Needs: Not on file   Physical Activity: Not on file   Stress: Not on file   Social Connections: Not on file   Intimate Partner Violence: Not on file   Housing Stability: Not on file      Current Medications:     Current Outpatient Medications   Medication Sig Dispense Refill   • atorvastatin (LIPITOR) 40 mg tablet TAKE 1 TABLET BY MOUTH DAILY 30 tablet 2   • Dolutegravir-lamiVUDine  MG TABS Take 30 caplets by mouth in the morning 30 tablet 3   • Emollient (Aquaphor Advanced Therapy) OINT Apply topically in the morning 396 g 1   • fluticasone (FLONASE) 50 mcg/act nasal spray 1 spray into each nostril daily 16 g 2   • lisinopril-hydrochlorothiazide (PRINZIDE,ZESTORETIC) 10-12 5 MG per tablet TAKE 1 TABLET BY MOUTH DAILY 30 tablet 2   • methocarbamol (ROBAXIN) 750 mg tablet Take 1-2 tablets by mouth "every 6 hours as needed for muscle pain/spasm 20 tablet 0   • tadalafil (CIALIS) 10 MG tablet TAKE 1 TABLET BY MOUTH DAILY AS NEEDED FOR ERECTILE DYSFUNCTION 10 tablet 3     No current facility-administered medications for this visit  Allergies:     No Known Allergies   Physical Exam:     /83   Pulse 81   Ht 5' 7\" (1 702 m)   Wt 82 6 kg (182 lb)   SpO2 97%   BMI 28 51 kg/m²     Physical Exam  Vitals and nursing note reviewed  Constitutional:       General: He is not in acute distress  Appearance: He is well-developed  HENT:      Head: Normocephalic and atraumatic  Eyes:      Conjunctiva/sclera: Conjunctivae normal    Cardiovascular:      Rate and Rhythm: Normal rate and regular rhythm  Heart sounds: No murmur heard  Pulmonary:      Effort: Pulmonary effort is normal  No respiratory distress  Breath sounds: Normal breath sounds  Abdominal:      Palpations: Abdomen is soft  Tenderness: There is no abdominal tenderness  Musculoskeletal:         General: No swelling  Cervical back: Neck supple  Skin:     General: Skin is warm and dry  Capillary Refill: Capillary refill takes less than 2 seconds  Neurological:      Mental Status: He is alert     Psychiatric:         Mood and Affect: Mood normal           CHERRI Hartman  [facility]  "

## 2024-08-06 ENCOUNTER — TELEPHONE (OUTPATIENT)
Dept: PULMONOLOGY | Facility: CLINIC | Age: 49
End: 2024-08-06

## 2024-08-06 ENCOUNTER — TELEPHONE (OUTPATIENT)
Age: 49
End: 2024-08-06

## 2024-08-06 NOTE — TELEPHONE ENCOUNTER
Pt calls in and states that he has a form from work that he needs filled out for work accomodations. This form needs to be submitted by 8/12 pt wondering if he can speak to provider or possibly get a telehealth visit. Please advise pt requesting a call back

## 2024-10-02 ENCOUNTER — OFFICE VISIT (OUTPATIENT)
Age: 49
End: 2024-10-02
Payer: COMMERCIAL

## 2024-10-02 VITALS
DIASTOLIC BLOOD PRESSURE: 88 MMHG | SYSTOLIC BLOOD PRESSURE: 144 MMHG | HEART RATE: 96 BPM | OXYGEN SATURATION: 97 % | TEMPERATURE: 98.2 F | BODY MASS INDEX: 26.43 KG/M2 | WEIGHT: 184.2 LBS

## 2024-10-02 DIAGNOSIS — J45.40 MODERATE PERSISTENT ASTHMA WITHOUT COMPLICATION: ICD-10-CM

## 2024-10-02 DIAGNOSIS — J30.1 NON-SEASONAL ALLERGIC RHINITIS DUE TO POLLEN: ICD-10-CM

## 2024-10-02 DIAGNOSIS — J43.8 PARASEPTAL EMPHYSEMA (HCC): Primary | ICD-10-CM

## 2024-10-02 PROCEDURE — 99214 OFFICE O/P EST MOD 30 MIN: CPT | Performed by: INTERNAL MEDICINE

## 2024-10-02 NOTE — ASSESSMENT & PLAN NOTE
He has paraseptal emphysema only along the visceral pleura does involve the fissure as well which is peculiar since the lower lobes are involved.  We have attributed previously to marijuana use in the past.  He is not smoking currently    This is a mild amount of paraseptal emphysema and should not be significantly affecting his symptoms.  There is no evidence of cystic lung disease in the parenchyma.  There is a very mild risk of spontaneous pneumothorax with paraseptal emphysema    We should check alpha-1 antitrypsin levels and phenotype  I think a follow-up CT in a year may be warranted.  I will call him after alpha-1 antitrypsin levels are completed to determine if this is necessary

## 2024-10-02 NOTE — PROGRESS NOTES
Pulmonary Outpatient Follow Up Note   Fam Aleman 48 y.o. male MRN: [id number]  10/2/2024    Reason for Consultation:    Chief Complaint   Patient presents with    Asthma     Assessment/Plan:    1. Paraseptal emphysema (HCC)  Assessment & Plan:  He has paraseptal emphysema only along the visceral pleura does involve the fissure as well which is peculiar since the lower lobes are involved.  We have attributed previously to marijuana use in the past.  He is not smoking currently    This is a mild amount of paraseptal emphysema and should not be significantly affecting his symptoms.  There is no evidence of cystic lung disease in the parenchyma.  There is a very mild risk of spontaneous pneumothorax with paraseptal emphysema    We should check alpha-1 antitrypsin levels and phenotype  I think a follow-up CT in a year may be warranted.  I will call him after alpha-1 antitrypsin levels are completed to determine if this is necessary  Orders:  -     Alpha 1 Antitrypsin Phenotype; Future  -     Alpha-1-antitrypsin; Future  -     CBC and differential; Future  -     Comprehensive metabolic panel; Future  2. Moderate persistent asthma without complication  Assessment & Plan:  Moderate persistent asthma with improving symptoms.  Previous episodes of recurrent bronchitis and shortness of breath in 2023 possibly related to residual effects from COVID-19 infection    -Continue albuterol as needed  -No longer using Advair  -Continue montelukast  -Follow-up as needed  3. Non-seasonal allergic rhinitis due to pollen  Assessment & Plan:  Continue Dymista as needed which has been very helpful for him.        Health Maintenance  Immunization History   Administered Date(s) Administered    COVID-19 MODERNA VACC 0.25 ML IM BOOSTER 12/29/2021    COVID-19 MODERNA VACC 0.5 ML IM 12/29/2021    COVID-19 PFIZER VACCINE 0.3 ML IM 05/03/2021, 05/26/2021    INFLUENZA 09/14/2022    Influenza Quadrivalent Preservative Free 3 years and older IM  09/01/2017, 09/13/2018, 10/21/2019, 10/29/2020, 12/04/2021    Influenza, seasonal, injectable, preservative free 11/26/2015    Tdap 02/14/2015, 12/09/2016        Return if symptoms worsen or fail to improve.    History of Present Illness   HPI:  Fam Aleman is a 48 y.o. male w/ hx of paraseptal emphysema, moderate persistent asthma, recurrent bronchitis, who is following up for asthma    10/2/24 - FU with me -respiratory symptoms have spontaneously gone into remission over the past year.  He thinks the problems that he had were mainly due to residual effects from COVID.  No recent respiratory infections.  He no longer uses Advair.  He uses albuterol occasionally when his chest feels tight but this is more rare now.  No antibiotic or steroid use over the past year.      He uses Dymista.  He still gets thick mucus in the back of the nose.  He gets a hazy white mucus in his nose.  No antihistamines.  He is still taking Singulair    4/20/23 - FU with me - he was last seen by me on 12/8/2022.  He then followed up with Mary Spencer on 3/8/2023.  Initially noted significant benefit from Breo but then that was not covered by insurance so he had switched to Advair.  He is currently on high-dose Advair.  He had episodes of mild hemoptysis and had cross-sectional imaging on 12/16/2022 and then subsequent x-rays in February and March that did not show any etiology for hemoptysis.  This self resolved.    He unfortunately has had chronic cough for a while, waxing and waning with sputum production.  He had a course of antibiotics in February and several courses of corticosteroids.  Currently Breo is not being covered by insurance so he is on Advair.    He has had frequent mucus production which is clearing up.  His PCP has added montelukast but he's not sure of the benefits.  He still gets intermittent nasal congestion, rhinorrhea in the morning, occasional cough.    History of sinus surgery and septoplasty.  He will use  albuterol before MMA and in the morning which helps clear his congestion.    Pulmonary history  He was born premature , but unsure of weeks of prematurity or if he required  intensive care.    He is unsure of his family history because he is adopted. As a child he was hospitalized for respiratory infections.  When playing sports as a child he would have significant coughing.  He never used an albuterol inhaler or any other inhalers as a child.     He is more dyspneic with exertion in recent years.  He has gained weight since the beginning of the pandemic.  He has shifted to mainly working from home. His main concern with dyspnea is going up stairs however he is able to play very active VR games without significant noticeable dyspnea.  Even when he was working outside of his home he says that he was not particularly active.  He does not go to a gym.  His work is mainly sedentary as a .       He has worked as a .  He does not have any significant inhalation or occupational exposures from work.  He used to work in [employer] but now primarily works at home.  He smoked years ago but had <5 pack year smoking hx.     During this period of time he has lived in the same house, with no changes to margarito, walls, carpeting.  He has a dog at home that sheds and dog danders noticeable on his shirt.      He has exposure to horses but does not manage or process hay.  No hot tubs/humidifiers.  No mold exposure.  No water damage at home.  He has a feather pillow but has had that for years    He has never been hospitalized or required ICU level care for respiratory problems.       He does have itchy eyes and redness of the eyes at times.       No significant acid reflux  Historical Information   Past Medical History:   Diagnosis Date    ADHD (attention deficit hyperactivity disorder)     Allergic rhinitis     Asperger's syndrome     Asthma     Myoclonic jerking     Pneumonia      Past Surgical History:    Procedure Laterality Date    BREAST LUMPECTOMY Left     HAND TENDON SURGERY Right 12/14/2016    Procedure: REPAIR EXTENSOR TENDON RIGHT THUMB ;  Surgeon: Simran Hurtado MD;  Location: [facility] OR;  Service:     SINUS SURGERY       Family History   Adopted: Yes   Problem Relation Age of Onset    Cancer Mother     Lung cancer Mother        Meds/Allergies     Current Outpatient Medications:     albuterol (PROVENTIL HFA,VENTOLIN HFA) 90 mcg/act inhaler, Inhale 2 puffs every 6 (six) hours as needed for wheezing or shortness of breath, Disp: 18 g, Rfl: 3    Azelastine-Fluticasone 137-50 MCG/ACT SUSP, SPRAY 1 SPRAY INTO EACH NOSTRIL TWICE A DAY NASAL TWICE A DAY FOR 30 DAYS, Disp: , Rfl:     guanFACINE HCl ER (INTUNIV) 3 MG TB24, , Disp: , Rfl:     methylphenidate (CONCERTA) 27 MG ER tablet, Take 27 mg by mouth daily, Disp: , Rfl:     montelukast (SINGULAIR) 10 mg tablet, , Disp: , Rfl:   Allergies   Allergen Reactions    Penicillin V Other (See Comments)    Penicillins        Vitals: Blood pressure 144/88, pulse 96, temperature 98.2 °F (36.8 °C), temperature source Tympanic, weight 83.6 kg (184 lb 3.2 oz), SpO2 97%. Body mass index is 26.43 kg/m². Oxygen Therapy  SpO2: 97 %  Oxygen Therapy: None (Room air)    Physical Exam  Vitals and nursing note reviewed.   Constitutional:       General: He is not in acute distress.     Appearance: He is well-developed.   HENT:      Head: Normocephalic and atraumatic.   Eyes:      Conjunctiva/sclera: Conjunctivae normal.   Cardiovascular:      Rate and Rhythm: Normal rate and regular rhythm.      Heart sounds: No murmur heard.  Pulmonary:      Effort: Pulmonary effort is normal. No respiratory distress.      Breath sounds: Normal breath sounds. No stridor. No wheezing, rhonchi or rales.   Abdominal:      Palpations: Abdomen is soft.      Tenderness: There is no abdominal tenderness.   Musculoskeletal:         General: No swelling.      Cervical back: Neck supple.   Skin:     General:  "Skin is warm and dry.      Capillary Refill: Capillary refill takes less than 2 seconds.   Neurological:      Mental Status: He is alert.   Psychiatric:         Mood and Affect: Mood normal.         Labs:   I have personally reviewed pertinent lab results.    ABG: No results found for: \"PHART\", \"DPV7YSF\", \"PO2ART\", \"MRI0NJG\", \"L6CGXMFF\", \"BEART\", \"SOURCE\",   BNP: No results found for: \"BNP\",   CBC:No results found for: \"WBC\", \"HGB\", \"HCT\", \"MCV\", \"PLT\", \"ADJUSTEDWBC\", \"EOSPCT\", \"EOSABS\", \"NEUTOPHILPCT\", \"LYMPHOPCT\",   CMP: No results found for: \"SODIUM\", \"K\", \"CL\", \"CO2\", \"ANIONGAP\", \"BUN\", \"CREATININE\", \"GLUCOSE\", \"CALCIUM\", \"AST\", \"ALT\", \"ALKPHOS\", \"PROT\", \"BILITOT\", \"EGFR\",   PT/INR: No results found for: \"PT\", \"INR\",   Troponin: No results found for: \"TROPONINI\"      Imaging and other studies: I have personally reviewed pertinent reports.   and I have personally reviewed pertinent films in PACS  I reviewed his chest x-ray from 11/8/2022, when he was still recovering from COVID-19 and there are mild, subtle lower lobe reticulations and infiltrates notable on the PA view.  CT chest wo  Contrast 12/16/22  Mild diffuse paraseptal emphysema  Centrally calcified 11mm LLL granuloma    Transthoracic Echo:  9/23/2022    Left Ventricle: Left ventricular cavity size is normal. Wall thickness is normal. The left ventricular ejection fraction is 55-60%. Systolic function is normal. Wall motion is normal. Diastolic function is normal.    Tricuspid Valve: The right ventricular systolic pressure is normal. The estimated right ventricular systolic pressure is 25.00 mmHg.    Pulmonary Artery: There is no evidence of systolic notching of the RVOT Doppler waveform.      Sergey Savage MD  Pulmonary, Critical Care and Sleep Medicine  [facility]  "

## 2024-10-02 NOTE — ASSESSMENT & PLAN NOTE
Moderate persistent asthma with improving symptoms.  Previous episodes of recurrent bronchitis and shortness of breath in 2023 possibly related to residual effects from COVID-19 infection    -Continue albuterol as needed  -No longer using Advair  -Continue montelukast  -Follow-up as needed